# Patient Record
Sex: FEMALE | Race: WHITE | NOT HISPANIC OR LATINO | Employment: FULL TIME | URBAN - NONMETROPOLITAN AREA
[De-identification: names, ages, dates, MRNs, and addresses within clinical notes are randomized per-mention and may not be internally consistent; named-entity substitution may affect disease eponyms.]

---

## 2021-06-15 ENCOUNTER — CONSULT (OUTPATIENT)
Dept: ONCOLOGY | Facility: CLINIC | Age: 51
End: 2021-06-15

## 2021-06-15 ENCOUNTER — LAB (OUTPATIENT)
Dept: LAB | Facility: HOSPITAL | Age: 51
End: 2021-06-15

## 2021-06-15 VITALS
HEART RATE: 85 BPM | HEIGHT: 68 IN | TEMPERATURE: 97.3 F | DIASTOLIC BLOOD PRESSURE: 78 MMHG | SYSTOLIC BLOOD PRESSURE: 132 MMHG | OXYGEN SATURATION: 99 % | WEIGHT: 207.4 LBS | BODY MASS INDEX: 31.43 KG/M2

## 2021-06-15 DIAGNOSIS — C43.59 MALIGNANT MELANOMA OF TORSO EXCLUDING BREAST (HCC): ICD-10-CM

## 2021-06-15 DIAGNOSIS — D64.9 ANEMIA, UNSPECIFIED TYPE: ICD-10-CM

## 2021-06-15 DIAGNOSIS — E66.9 OBESITY, UNSPECIFIED CLASSIFICATION, UNSPECIFIED OBESITY TYPE, UNSPECIFIED WHETHER SERIOUS COMORBIDITY PRESENT: ICD-10-CM

## 2021-06-15 DIAGNOSIS — D75.89 BONE MARROW DEPRESSION: ICD-10-CM

## 2021-06-15 DIAGNOSIS — E61.1 IRON DEFICIENCY: ICD-10-CM

## 2021-06-15 DIAGNOSIS — C43.59 MALIGNANT MELANOMA OF TORSO EXCLUDING BREAST (HCC): Primary | ICD-10-CM

## 2021-06-15 LAB
ALBUMIN SERPL-MCNC: 4.1 G/DL (ref 3.5–5.2)
ALBUMIN/GLOB SERPL: 1.2 G/DL
ALP SERPL-CCNC: 103 U/L (ref 39–117)
ALT SERPL W P-5'-P-CCNC: 17 U/L (ref 1–33)
ANION GAP SERPL CALCULATED.3IONS-SCNC: 9 MMOL/L (ref 5–15)
AST SERPL-CCNC: 17 U/L (ref 1–32)
BASOPHILS # BLD AUTO: 0.05 10*3/MM3 (ref 0–0.2)
BASOPHILS NFR BLD AUTO: 0.8 % (ref 0–1.5)
BILIRUB SERPL-MCNC: 0.3 MG/DL (ref 0–1.2)
BUN SERPL-MCNC: 13 MG/DL (ref 6–20)
BUN/CREAT SERPL: 21.7 (ref 7–25)
CALCIUM SPEC-SCNC: 10 MG/DL (ref 8.6–10.5)
CHLORIDE SERPL-SCNC: 101 MMOL/L (ref 98–107)
CO2 SERPL-SCNC: 26 MMOL/L (ref 22–29)
CREAT SERPL-MCNC: 0.6 MG/DL (ref 0.57–1)
DEPRECATED RDW RBC AUTO: 59.8 FL (ref 37–54)
EOSINOPHIL # BLD AUTO: 0.26 10*3/MM3 (ref 0–0.4)
EOSINOPHIL NFR BLD AUTO: 4.3 % (ref 0.3–6.2)
ERYTHROCYTE [DISTWIDTH] IN BLOOD BY AUTOMATED COUNT: 22.2 % (ref 12.3–15.4)
ERYTHROCYTE [SEDIMENTATION RATE] IN BLOOD: 19 MM/HR (ref 0–20)
FERRITIN SERPL-MCNC: 13.07 NG/ML (ref 13–150)
FOLATE SERPL-MCNC: 16.7 NG/ML (ref 4.78–24.2)
GFR SERPL CREATININE-BSD FRML MDRD: 105 ML/MIN/1.73
GLOBULIN UR ELPH-MCNC: 3.4 GM/DL
GLUCOSE SERPL-MCNC: 117 MG/DL (ref 65–99)
HCT VFR BLD AUTO: 36.7 % (ref 34–46.6)
HGB BLD-MCNC: 10.5 G/DL (ref 12–15.9)
IMM GRANULOCYTES # BLD AUTO: 0.01 10*3/MM3 (ref 0–0.05)
IMM GRANULOCYTES NFR BLD AUTO: 0.2 % (ref 0–0.5)
IRON 24H UR-MRATE: 29 MCG/DL (ref 37–145)
IRON SATN MFR SERPL: 5 % (ref 20–50)
LYMPHOCYTES # BLD AUTO: 1.32 10*3/MM3 (ref 0.7–3.1)
LYMPHOCYTES NFR BLD AUTO: 22.1 % (ref 19.6–45.3)
MCH RBC QN AUTO: 22.1 PG (ref 26.6–33)
MCHC RBC AUTO-ENTMCNC: 28.6 G/DL (ref 31.5–35.7)
MCV RBC AUTO: 77.3 FL (ref 79–97)
MONOCYTES # BLD AUTO: 0.42 10*3/MM3 (ref 0.1–0.9)
MONOCYTES NFR BLD AUTO: 7 % (ref 5–12)
NEUTROPHILS NFR BLD AUTO: 3.92 10*3/MM3 (ref 1.7–7)
NEUTROPHILS NFR BLD AUTO: 65.6 % (ref 42.7–76)
NRBC BLD AUTO-RTO: 0 /100 WBC (ref 0–0.2)
PLATELET # BLD AUTO: 332 10*3/MM3 (ref 140–450)
PMV BLD AUTO: 10.5 FL (ref 6–12)
POTASSIUM SERPL-SCNC: 4 MMOL/L (ref 3.5–5.2)
PROT SERPL-MCNC: 7.5 G/DL (ref 6–8.5)
RBC # BLD AUTO: 4.75 10*6/MM3 (ref 3.77–5.28)
RETICS # AUTO: 0.1 10*6/MM3 (ref 0.02–0.13)
RETICS/RBC NFR AUTO: 2.14 % (ref 0.7–1.9)
SODIUM SERPL-SCNC: 136 MMOL/L (ref 136–145)
TIBC SERPL-MCNC: 539 MCG/DL (ref 298–536)
TRANSFERRIN SERPL-MCNC: 362 MG/DL (ref 200–360)
VIT B12 BLD-MCNC: 979 PG/ML (ref 211–946)
WBC # BLD AUTO: 5.98 10*3/MM3 (ref 3.4–10.8)

## 2021-06-15 PROCEDURE — 80053 COMPREHEN METABOLIC PANEL: CPT

## 2021-06-15 PROCEDURE — 82746 ASSAY OF FOLIC ACID SERUM: CPT

## 2021-06-15 PROCEDURE — 82728 ASSAY OF FERRITIN: CPT

## 2021-06-15 PROCEDURE — 82607 VITAMIN B-12: CPT

## 2021-06-15 PROCEDURE — 36415 COLL VENOUS BLD VENIPUNCTURE: CPT

## 2021-06-15 PROCEDURE — 85651 RBC SED RATE NONAUTOMATED: CPT

## 2021-06-15 PROCEDURE — 85045 AUTOMATED RETICULOCYTE COUNT: CPT

## 2021-06-15 PROCEDURE — 83540 ASSAY OF IRON: CPT

## 2021-06-15 PROCEDURE — 85025 COMPLETE CBC W/AUTO DIFF WBC: CPT

## 2021-06-15 PROCEDURE — 99204 OFFICE O/P NEW MOD 45 MIN: CPT | Performed by: INTERNAL MEDICINE

## 2021-06-15 PROCEDURE — 84466 ASSAY OF TRANSFERRIN: CPT

## 2021-06-15 RX ORDER — LISINOPRIL 40 MG/1
40 TABLET ORAL DAILY
COMMUNITY

## 2021-06-15 RX ORDER — FERROUS SULFATE 325(65) MG
325 TABLET ORAL
COMMUNITY
End: 2021-08-05

## 2021-06-15 RX ORDER — HYDROCHLOROTHIAZIDE 25 MG/1
25 TABLET ORAL DAILY
COMMUNITY

## 2021-06-15 RX ORDER — PANTOPRAZOLE SODIUM 20 MG/1
20 TABLET, DELAYED RELEASE ORAL DAILY
COMMUNITY

## 2021-06-15 RX ORDER — ATORVASTATIN CALCIUM 20 MG/1
20 TABLET, FILM COATED ORAL DAILY
COMMUNITY
End: 2021-07-19

## 2021-06-15 NOTE — ASSESSMENT & PLAN NOTE
1. 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 She believes taht over the last 27 years she could have been exposed organic chemicals  3. We will run initial FLOW study to R/O signs of BM problems

## 2021-06-15 NOTE — PROGRESS NOTES
[facility]  [address]  [place] [address] [phone]    Chief Complaint  iron deficiency    Subjective            REASON FOR CONSULTATION: Pilar Mesa is a 51 y.o. female referred by Rahul Griggs DO for diagnostic and management recommendations for Fe def anemia .    She is here today mainly for anemia. She had a long HX of Fe deficiency and could not tolerate oral Fe tablets. She had a recent fast weight gain and has been very much stressed out. Her recent HGB was only 8 ranges and other related tests did show evidence of Fe deficiency. 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 Over the last 6 month she has been feeling very tired    Of note she had the DX of abd wall melanoma in 2010 and she has had wide local resection of mid abd skin/soft tissue in addition to deep resection close to abd muscle wall. This is based on the fact that the path report did show potential muscle invasion.    She is now concerned that she did not have surveillance study and also never had discussion for ADJ TX while she does understand that the chance for recurrent melanoma is sigfnicant    Oncology/Hematology History    No history exists.         PAST MEDICAL HISTORY:  ALLERGIES:  Allergies   Allergen Reactions   • Neosporin Plus Max St Rash     CURRENT MEDICATIONS:  Outpatient Encounter Medications as of 6/15/2021   Medication Sig Dispense Refill   • atorvastatin (LIPITOR) 20 MG tablet Take 20 mg by mouth Daily.     • ferrous sulfate 325 (65 FE) MG tablet Take 325 mg by mouth Daily With Breakfast.     • hydroCHLOROthiazide (HYDRODIURIL) 25 MG tablet Take 25 mg by mouth Daily.     • lisinopril (PRINIVIL,ZESTRIL) 40 MG tablet Take 40 mg by mouth Daily.     • pantoprazole (PROTONIX) 20 MG EC tablet Take 20 mg by mouth Daily.        No facility-administered encounter medications on file as of 6/15/2021.     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   • Obesity E66.9     SURGERIES:  Past Surgical History:   Procedure Laterality Date   • APPENDECTOMY     • CHOLECYSTECTOMY  2017   • LYMPH NODE BIOPSY     • POSTPARTUM TUBAL LIGATION     • SINUS SURGERY     • SKIN CANCER EXCISION       HEALTH MAINTENANCE ITEMS:  Health Maintenance Due   Topic Date Due   • MAMMOGRAM  Never done   • COLORECTAL CANCER SCREENING  Never done   • ANNUAL PHYSICAL  Never done   • COVID-19 Vaccine (1) Never done   • TDAP/TD VACCINES (1 - Tdap) Never done   • ZOSTER VACCINE (1 of 2) Never done   • HEPATITIS C SCREENING  Never done   • PAP SMEAR  Never done       <no information>  Last Completed Colonoscopy     This patient has no relevant Health Maintenance data.          There is no immunization history on file for this patient.  Last Completed Mammogram     This patient has no relevant Health Maintenance data.            FAMILY HISTORY:  Family History   Problem Relation Age of Onset   • Heart disease Father    • Lung cancer Father      SOCIAL HISTORY:  Social History     Socioeconomic History   • Marital status:      Spouse name: Not on file   • Number of children: Not on file   • Years of education: Not on file   • Highest education level: Not on file   Tobacco Use   • Smoking status: Never Smoker   • Smokeless tobacco: Never Used       REVIEW OF SYSTEMS:  Review of Systems   Constitutional: Positive for fatigue and unexpected weight gain.   HENT: Negative.    Eyes: Negative.    Respiratory: Positive for shortness of breath.    Cardiovascular: Negative.    Gastrointestinal: Negative.    Endocrine: Negative.    Genitourinary: Negative.    Musculoskeletal: Positive for back pain.  "  Allergic/Immunologic: Negative.    Neurological: Positive for weakness.   Psychiatric/Behavioral: Negative.        /78   Pulse 85   Temp 97.3 °F (36.3 °C)   Ht 172.7 cm (68\")   Wt 94.1 kg (207 lb 6.4 oz)   SpO2 99%   Breastfeeding No   BMI 31.54 kg/m²  Body surface area is 2.08 meters squared.  There were no vitals filed for this visit.    Objective   Vital Signs:   /78   Pulse 85   Temp 97.3 °F (36.3 °C)   Ht 172.7 cm (68\")   Wt 94.1 kg (207 lb 6.4 oz)   SpO2 99%   Breastfeeding No   BMI 31.54 kg/m²  Body surface area is 2.08 meters squared.  There were no vitals filed for this visit.  Pilar Mesa reports a pain score of .  Given her pain assessment as noted, treatment options were discussed and the following options were decided upon as a follow-up plan to address the patient's pain: .      Patient's Body mass index is 31.54 kg/m². indicating that she is .      Physical Exam  Constitutional:       Appearance: Normal appearance. She is obese.   HENT:      Head: Atraumatic.      Mouth/Throat:      Mouth: Mucous membranes are moist.   Pulmonary:      Effort: Pulmonary effort is normal.   Abdominal:      Palpations: Abdomen is soft.      Comments: She has mid to upper abd  Close to 20 cm scar from prior melanoma surgery   Musculoskeletal:         General: Normal range of motion.      Cervical back: Normal range of motion.   Neurological:      General: No focal deficit present.      Mental Status: She is oriented to person, place, and time.   Psychiatric:         Mood and Affect: Mood normal.         Behavior: Behavior normal.            Result Review :     LABS    Lab Results - Last 18 Months   Lab Units 06/15/21  0910   HEMOGLOBIN g/dL 10.5*   HEMATOCRIT % 36.7   MCV fL 77.3*   WBC 10*3/mm3 5.98   RDW % 22.2*   MPV fL 10.5   PLATELETS 10*3/mm3 332   IMM GRAN % % 0.2   NEUTROS ABS 10*3/mm3 3.92   LYMPHS ABS 10*3/mm3 1.32   MONOS ABS 10*3/mm3 0.42   EOS ABS 10*3/mm3 0.26   BASOS ABS " 10*3/mm3 0.05   IMMATURE GRANS (ABS) 10*3/mm3 0.01   NRBC /100 WBC 0.0       Lab Results - Last 18 Months   Lab Units 06/15/21  0910   GLUCOSE mg/dL 117*   SODIUM mmol/L 136   POTASSIUM mmol/L 4.0   CO2 mmol/L 26.0   CHLORIDE mmol/L 101   ANION GAP mmol/L 9.0   CREATININE mg/dL 0.60   BUN mg/dL 13   BUN / CREAT RATIO  21.7   CALCIUM mg/dL 10.0   EGFR IF NONAFRICN AM mL/min/1.73 105   ALK PHOS U/L 103   TOTAL PROTEIN g/dL 7.5   ALT (SGPT) U/L 17   AST (SGOT) U/L 17   BILIRUBIN mg/dL 0.3   ALBUMIN g/dL 4.10   GLOBULIN gm/dL 3.4       No results for input(s): MSPIKE, KAPPALAMB, IGLFLC, URICACID, FREEKAPPAL, CEA, LDH, REFLABREPO in the last 93269 hours.    Lab Results - Last 18 Months   Lab Units 06/15/21  0910   IRON mcg/dL 29*   TIBC mcg/dL 539*   IRON SATURATION % 5*   FERRITIN ng/mL 13.07              Assessment and Plan    Problem List Items Addressed This Visit        Other    Iron deficiency    Current Assessment & Plan     1. She has a long HX of Fe deficiency  2. We have discussed about a complete initial SAM  3. She will be back in 2 weeks  4. She has tried oral Fe table ts before and could not tolerate it due to abd pain constipation and diarrhea         Relevant Orders    CBC & Differential (Completed)    Ferritin (Completed)    Reticulocytes (Completed)    Vitamin B12    Sedimentation Rate (Completed)    Malignant melanoma of torso excluding breast (CMS/HCC) - Primary    Current Assessment & Plan     1. She had her melanoma removed with LN dissection, due to some of the high risk profiles  2. She did not have ADJ TX  3. She will need PET CT restaging  4. We will first do the Ct scan of chest/abd /pelvis  5. On exam there was large abd scan on her upper abd areas         Relevant Orders    NM Pet Skull Base To Mid Thigh    Sedimentation Rate (Completed)    Flow Cytometry, Blood    CT chest w contrast    CT abdomen pelvis w contrast    Anemia    Current Assessment & Plan     1. 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  She believes taht over the last 27 years she could have been exposed organic chemicals  3. We will run initial FLOW study to R/O signs of BM problems         Relevant Medications    ferrous sulfate 325 (65 FE) MG tablet    Other Relevant Orders    CBC & Differential (Completed)    Comprehensive Metabolic Panel (Completed)    Iron Profile (Completed)    Ferritin (Completed)    Reticulocytes (Completed)    Vitamin B12    Folate    Sedimentation Rate (Completed)    Flow Cytometry, Blood    Bone marrow depression    Current Assessment & Plan     1. Her Hgb is too low even considering low Fe profiles  2. Based on overall prior work HX and CBC pattern in addition to work HX, we have decided to run BM BX         Relevant Orders    Flow Cytometry, Blood    Obesity    Current Assessment & Plan     1. We have discussed about the benefit of structured weight loss program on both loss and production of RBC         Relevant Orders    Ambulatory Referral to Bariatric Surgery        In summary  1. She has HGB of 8 range and HX of aggressive melanoma  2. We have discussed about various necessary steps as above  3. She will be back in 2 weeks  4. She will likely need IV FE in return visit    Follow Up     Patient was given instructions and counseling regarding her condition or for health maintenance advice. Please see specific information pulled into the AVS if appropriate.

## 2021-06-15 NOTE — ASSESSMENT & PLAN NOTE
1. We have discussed about the benefit of structured weight loss program on both loss and production of RBC

## 2021-06-15 NOTE — ASSESSMENT & PLAN NOTE
1. She has a long HX of Fe deficiency  2. We have discussed about a complete initial SAM  3. She will be back in 2 weeks  4. She has tried oral Fe table ts before and could not tolerate it due to abd pain constipation and diarrhea

## 2021-06-15 NOTE — ASSESSMENT & PLAN NOTE
1. Her Hgb is too low even considering low Fe profiles  2. Based on overall prior work HX and CBC pattern in addition to work HX, we have decided to run BM BX

## 2021-06-15 NOTE — ASSESSMENT & PLAN NOTE
1. She had her melanoma removed with LN dissection, due to some of the high risk profiles  2. She did not have ADJ TX  3. She will need PET CT restaging  4. We will first do the Ct scan of chest/abd /pelvis  5. On exam there was large abd scan on her upper abd areas

## 2021-06-16 ENCOUNTER — LAB (OUTPATIENT)
Dept: LAB | Facility: HOSPITAL | Age: 51
End: 2021-06-16

## 2021-06-16 DIAGNOSIS — C43.59 MALIGNANT MELANOMA OF TORSO EXCLUDING BREAST (HCC): ICD-10-CM

## 2021-06-16 DIAGNOSIS — D64.9 ANEMIA, UNSPECIFIED TYPE: ICD-10-CM

## 2021-06-16 DIAGNOSIS — D75.89 BONE MARROW DEPRESSION: ICD-10-CM

## 2021-06-18 ENCOUNTER — TELEPHONE (OUTPATIENT)
Dept: ONCOLOGY | Facility: CLINIC | Age: 51
End: 2021-06-18

## 2021-06-18 LAB — REF LAB TEST METHOD: NORMAL

## 2021-06-18 NOTE — TELEPHONE ENCOUNTER
Caller: LYDIA    Relationship: [facility]    Best call back number: [phone]     What test/procedure requested: CT CHEST/ ABDOMEN/PELVIS    When is it needed: NA    Where is the test/procedure going to be performed: [facility]    Additional information or concerns: INS DENIED PROCEDURE BUT ARE OFFERING A PEER TO PEER.   CASE # [id number]  PEER TO PEER # [phone]   PLEASE COMPLETE WITHIN 10 DAYS

## 2021-06-23 ENCOUNTER — OFFICE VISIT (OUTPATIENT)
Dept: BARIATRICS/WEIGHT MGMT | Facility: HOSPITAL | Age: 51
End: 2021-06-23

## 2021-06-23 ENCOUNTER — OFFICE VISIT (OUTPATIENT)
Dept: BARIATRICS/WEIGHT MGMT | Facility: CLINIC | Age: 51
End: 2021-06-23

## 2021-06-23 VITALS
SYSTOLIC BLOOD PRESSURE: 131 MMHG | HEART RATE: 94 BPM | HEIGHT: 66 IN | OXYGEN SATURATION: 99 % | WEIGHT: 208.6 LBS | DIASTOLIC BLOOD PRESSURE: 87 MMHG | TEMPERATURE: 98.6 F | BODY MASS INDEX: 33.52 KG/M2

## 2021-06-23 DIAGNOSIS — E66.09 CLASS 1 OBESITY DUE TO EXCESS CALORIES WITH SERIOUS COMORBIDITY AND BODY MASS INDEX (BMI) OF 33.0 TO 33.9 IN ADULT: Primary | ICD-10-CM

## 2021-06-23 DIAGNOSIS — K21.9 GASTROESOPHAGEAL REFLUX DISEASE, UNSPECIFIED WHETHER ESOPHAGITIS PRESENT: ICD-10-CM

## 2021-06-23 PROBLEM — E66.811 CLASS 1 OBESITY DUE TO EXCESS CALORIES WITH SERIOUS COMORBIDITY AND BODY MASS INDEX (BMI) OF 33.0 TO 33.9 IN ADULT: Status: ACTIVE | Noted: 2021-06-15

## 2021-06-23 PROCEDURE — 99203 OFFICE O/P NEW LOW 30 MIN: CPT | Performed by: SURGERY

## 2021-06-23 NOTE — PROGRESS NOTES
Patient Care Team:  Rahul Griggs DO as PCP - General (Family Medicine)    Reason for Visit:  Weight loss    Subjective      Pilar Mesa is a pleasant 51 y.o. female and presents with morbid obesity with her Body mass index is 33.42 kg/m²..    She is here for discussion of weight loss options.  She stated she has been with the disease of obesity for year(s).  She stated she suffers from hypertension, GERD and morbid obesity due to her weight gain.  She stated that weight loss helps alleviate these symptoms.   She stated that she has tried no structured dietary regimen to help with weight loss.  She stated that she has attempted these conservative methods for weight loss without maintaining long term success.  Today she would like to discuss nonsurgical options for the treatment of her morbid obesity.      Review of Systems  General ROS: positive for  - fatigue and weight gain  Psychological ROS: positive for - anxiety and depression  Endocrine ROS: negative  Respiratory ROS: no cough, shortness of breath, or wheezing  Cardiovascular ROS: no chest pain or dyspnea on exertion  Gastrointestinal ROS: no abdominal pain, change in bowel habits, or black or bloody stools  Musculoskeletal ROS: positive for - joint pain    History  Past Medical History:   Diagnosis Date   • GERD (gastroesophageal reflux disease)    • High cholesterol    • Skin cancer 2010     Past Surgical History:   Procedure Laterality Date   • APPENDECTOMY     • CHOLECYSTECTOMY  2017   • LYMPH NODE BIOPSY     • POSTPARTUM TUBAL LIGATION     • SINUS SURGERY     • SKIN CANCER EXCISION       Family History   Problem Relation Age of Onset   • Heart disease Father    • Lung cancer Father      Social History     Tobacco Use   • Smoking status: Never Smoker   • Smokeless tobacco: Never Used   Substance Use Topics   • Alcohol use: Not on file   • Drug use: Not on file     E-cigarette/Vaping     E-cigarette/Vaping Substances     (Not in a hospital  admission)    Allergies:  Neosporin plus max st      Current Outpatient Medications:   •  atorvastatin (LIPITOR) 20 MG tablet, Take 20 mg by mouth Daily., Disp: , Rfl:   •  ferrous sulfate 325 (65 FE) MG tablet, Take 325 mg by mouth Daily With Breakfast. Every other day, Disp: , Rfl:   •  hydroCHLOROthiazide (HYDRODIURIL) 25 MG tablet, Take 25 mg by mouth Daily., Disp: , Rfl:   •  lisinopril (PRINIVIL,ZESTRIL) 40 MG tablet, Take 40 mg by mouth Daily., Disp: , Rfl:   •  pantoprazole (PROTONIX) 20 MG EC tablet, Take 20 mg by mouth Daily., Disp: , Rfl:     Objective     Vital Signs  Temp:  [98.6 °F (37 °C)] 98.6 °F (37 °C)  Heart Rate:  [94] 94  BP: (131)/(87) 131/87  Body mass index is 33.42 kg/m².      06/23/21  0923   Weight: 94.6 kg (208 lb 9.6 oz)       General Appearance: Alert and oriented in no acute distress  Lungs:  Normal expansion.  Clear to auscultation.  No rales, rhonchi, or wheezing.  Heart:  Heart regular rate and rhythm  Abdomen:  Soft, non-tender, normal bowel sounds; no bruits, organomegaly or masses.  Abnormal shape: obese      Results Review:   None        Assessment/Plan   Encounter Diagnoses   Name Primary?   • Class 1 obesity due to excess calories with serious comorbidity and body mass index (BMI) of 33.0 to 33.9 in adult Yes   • Gastroesophageal reflux disease, unspecified whether esophagitis present        She has been provided a structured dietary regimen based off of her behavior.  I discussed with the patient the etiology of the disease of obesity and the potential comorbid conditions associated with this disease.  She was instructed to follow the dietary regimen and follow-up with our program in 1 month's time with any additional questions as they may arise during this time.  We emphasized on focusing on proteins and meals high in fiber as well as adequate hydration that exceed 64 ounces of water daily.  I recommended the patient record daily a food journal that would incorporate what  "she is eating and how many times she is eating during the day.  My recommendation included at least 21 consecutive days of recording of these meals.  I explained that I anticipate the patient to lose weight prior to her next monthly visit.  I have also explained that they need to record or document when they are going to have the \"cheat day\" as well as create a food journal to help monitor their behavior and food choices.    I discussed the patient's findings and my recommendations with patient. The patient was made aware that we are primarily a surgical program.  We reviewed different weight loss surgical procedures including the laparoscopic sleeve gastrectomy, gastric band and Miquel-en-Y gastric bypass.  I explained to the patient that medical treatment alone is ineffective for long-term results and for the reversal of morbid obesity. She and I discussed the etiology of the disease of morbid obesity.  I emphasized that weight loss alone statistically will not reverse this disease of obesity.  Our program is not a \"weight loss program\" but focuses on the overall issue of morbid obesity and the patient has been educated on what steps will be necessary to be successful in reversing this disease of morbid obesity at our facility.    I have explained the 3 pearls of our program for the patient to follow to optimize success:1.  Putting their health first, 2.  Not trying to treat the disease on their own, 3.  Attempting to make their scheduled appointments.  The patient was in agreement to following these recommendations.  The patient was also notified that our dietitian will be contacting them soon for follow-up on how they are doing with the new prescription.    Patient is to follow-up with us in 1 month's time.    Dr. Jd Zapata MD FACS    06/23/21  12:45 CDT  Patient Care Team:  Rahul Griggs, DO as PCP - General (Family Medicine)    "

## 2021-06-23 NOTE — PROGRESS NOTES
"Nutrition Services    Patient Name:  Pilar Mesa  YOB: 1970  MRN: [id number]  Admit Date:  (Not on file)    NUTRITION BARIATRIC/[facility] NOTE     Visit 1  Initial Assessment   BA#   [facility]    Anthropometrics   Height: 66.25 in  Weight: 208 lbs 9.6 oz  BMI: 33.42    Nutrition Recall  Eating __3____ meals daily   Snacking  Limited sweet intake  Drinking carbonated beverages  Drinking less than 64 fluid ounces    Education    Goal Setting and Information Packet  4 meal per day diet plan  4 meal per day sample menu  \"Perfect Protein, Fiber in Foods, and Reducing Fat\"  Reinforce Nutritional Needs for Surgery  Reinforce Nutritional Needs for [facility]    Nutrition Goals   Eat __4___ meals per day with protein  Eat protein first at meals  Protein goal: 65gms   discussed protein guidelines for shakes and bar  Eliminate snacks  Healthier food choices  Portion control / Use smaller plate or measuring cup   Eliminate soda  Replace sugar beverages with artifical sweetened   Increase fluid intake to 64 ounces per day        Electronically signed by:  Elsi Rosario  06/23/21 11:48 CDT   "

## 2021-06-25 ENCOUNTER — APPOINTMENT (OUTPATIENT)
Dept: CT IMAGING | Facility: HOSPITAL | Age: 51
End: 2021-06-25

## 2021-06-29 ENCOUNTER — OFFICE VISIT (OUTPATIENT)
Dept: ONCOLOGY | Facility: CLINIC | Age: 51
End: 2021-06-29

## 2021-06-29 ENCOUNTER — TRANSCRIBE ORDERS (OUTPATIENT)
Dept: LAB | Facility: HOSPITAL | Age: 51
End: 2021-06-29

## 2021-06-29 VITALS
BODY MASS INDEX: 33.04 KG/M2 | HEART RATE: 102 BPM | SYSTOLIC BLOOD PRESSURE: 124 MMHG | RESPIRATION RATE: 18 BRPM | DIASTOLIC BLOOD PRESSURE: 72 MMHG | WEIGHT: 205.6 LBS | TEMPERATURE: 97.7 F | OXYGEN SATURATION: 95 % | HEIGHT: 66 IN

## 2021-06-29 DIAGNOSIS — Z01.818 PREOPERATIVE TESTING: Primary | ICD-10-CM

## 2021-06-29 DIAGNOSIS — E61.1 IRON DEFICIENCY: ICD-10-CM

## 2021-06-29 DIAGNOSIS — C43.8 MALIGNANT MELANOMA OF OVERLAPPING SITES (HCC): ICD-10-CM

## 2021-06-29 DIAGNOSIS — D75.89 BONE MARROW DEPRESSION: ICD-10-CM

## 2021-06-29 DIAGNOSIS — D64.9 ANEMIA, UNSPECIFIED TYPE: Primary | ICD-10-CM

## 2021-06-29 PROCEDURE — 99214 OFFICE O/P EST MOD 30 MIN: CPT | Performed by: INTERNAL MEDICINE

## 2021-06-29 RX ORDER — DIPHENHYDRAMINE HYDROCHLORIDE 50 MG/ML
50 INJECTION INTRAMUSCULAR; INTRAVENOUS AS NEEDED
Status: CANCELLED | OUTPATIENT
Start: 2021-07-13

## 2021-06-29 RX ORDER — TEMAZEPAM 15 MG/1
15 CAPSULE ORAL NIGHTLY PRN
COMMUNITY
End: 2021-11-04

## 2021-06-29 RX ORDER — SIMVASTATIN 20 MG
20 TABLET ORAL NIGHTLY
COMMUNITY
End: 2021-11-04

## 2021-06-29 RX ORDER — SERTRALINE HYDROCHLORIDE 25 MG/1
25 TABLET, FILM COATED ORAL DAILY
COMMUNITY

## 2021-06-29 RX ORDER — FAMOTIDINE 10 MG/ML
20 INJECTION, SOLUTION INTRAVENOUS AS NEEDED
Status: CANCELLED | OUTPATIENT
Start: 2021-07-13

## 2021-06-29 RX ORDER — SODIUM CHLORIDE 9 MG/ML
250 INJECTION, SOLUTION INTRAVENOUS ONCE
Status: CANCELLED | OUTPATIENT
Start: 2021-07-13

## 2021-06-29 RX ORDER — BUSPIRONE HYDROCHLORIDE 10 MG/1
10 TABLET ORAL 3 TIMES DAILY
COMMUNITY

## 2021-06-29 RX ORDER — ACETAMINOPHEN 325 MG/1
650 TABLET ORAL ONCE
Status: CANCELLED | OUTPATIENT
Start: 2021-07-13

## 2021-06-29 NOTE — PROGRESS NOTES
[facility] HEMATOLOGY AND ONCOLOGY  [address]  [facility] [address] [phone]    Chief Complaint  Follow-up    Subjective    1. fatigue       Oncology/Hematology History    No history exists.     She is here today mainly for anemia. She had a long HX of Fe deficiency and could not tolerate oral Fe tablets. She had a recent fast weight gain and has been very much stressed out. Her recent HGB was only 8 ranges and other related tests did show evidence of Fe deficiency. 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 Over the last 6 month she has been feeling very tired     Of note she had the DX of abd wall melanoma in 2010 and she has had wide local resection of mid abd skin/soft tissue in addition to deep resection close to abd muscle wall. This is based on the fact that the path report did show potential muscle invasion.     She is now concerned that she did not have surveillance study and also never had discussion for ADJ TX while she does understand that the chance for recurrent melanoma is sigfnicant    PAST MEDICAL HISTORY:  ALLERGIES:  Allergies   Allergen Reactions   • Neosporin Plus Max St Rash     CURRENT MEDICATIONS:  Outpatient Encounter Medications as of 6/29/2021   Medication Sig Dispense Refill   • busPIRone (BUSPAR) 10 MG tablet Take 10 mg by mouth 3 (Three) Times a Day.     • ferrous sulfate 325 (65 FE) MG tablet Take 325 mg by mouth Daily With Breakfast. Every other day     • hydroCHLOROthiazide (HYDRODIURIL) 25 MG tablet Take 25 mg by mouth Daily.     • lisinopril (PRINIVIL,ZESTRIL) 40 MG tablet Take 40 mg by mouth Daily.     • pantoprazole (PROTONIX) 20 MG EC tablet Take 20 mg by mouth Daily.     • sertraline (ZOLOFT) 25 MG tablet Take 25 mg by mouth Daily.     • simvastatin (Zocor) 20 MG tablet Take 20 mg by mouth Every Night.      • temazepam (RESTORIL) 15 MG capsule Take 15 mg by mouth At Night As Needed for Sleep.     • atorvastatin (LIPITOR) 20 MG tablet Take 20 mg by mouth Daily.       No facility-administered encounter medications on file as of 6/29/2021.     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   • Class 1 obesity due to excess calories with serious comorbidity and body mass index (BMI) of 33.0 to 33.9 in adult E66.09, Z68.33   • GERD (gastroesophageal reflux disease) K21.9   • Malignant melanoma of overlapping sites (CMS/HCC) C43.8     SURGERIES:  Past Surgical History:   Procedure Laterality Date   • APPENDECTOMY     • CHOLECYSTECTOMY  2017   • LYMPH NODE BIOPSY     • POSTPARTUM TUBAL LIGATION     • SINUS SURGERY     • SKIN CANCER EXCISION       HEALTH MAINTENANCE ITEMS:  Health Maintenance Due   Topic Date Due   • MAMMOGRAM  Never done   • COLORECTAL CANCER SCREENING  Never done   • ANNUAL PHYSICAL  Never done   • COVID-19 Vaccine (1) Never done   • TDAP/TD VACCINES (1 - Tdap) Never done   • ZOSTER VACCINE (1 of 2) Never done   • HEPATITIS C SCREENING  Never done   • PAP SMEAR  Never done   • LIPID PANEL  Never done       <no information>  Last Completed Colonoscopy     This patient has no relevant Health Maintenance data.          There is no immunization history on file for this patient.  Last Completed Mammogram     This patient has no relevant Health Maintenance data.            FAMILY HISTORY:  Family History   Problem Relation Age of Onset   • Heart disease Father    • Lung cancer Father      SOCIAL HISTORY:  Social History     Socioeconomic History   • Marital status:      Spouse name: Not on file   • Number of children: Not on file   • Years of education: Not on file   • Highest education level: Not on file   Tobacco Use   • Smoking status: Never  "Smoker   • Smokeless tobacco: Never Used       REVIEW OF SYSTEMS:  Review of Systems   Constitutional: Positive for fatigue.   HENT: Negative.    Eyes: Negative.    Respiratory: Negative.    Cardiovascular: Negative.    Gastrointestinal: Negative.    Endocrine: Negative.    Genitourinary: Negative.    Musculoskeletal: Negative.    Skin: Positive for dry skin.   Allergic/Immunologic: Negative.    Neurological: Negative.    Psychiatric/Behavioral: Positive for stress.       /72   Pulse 102   Temp 97.7 °F (36.5 °C) (Temporal)   Resp 18   Ht 168.3 cm (66.25\")   Wt 93.3 kg (205 lb 9.6 oz)   SpO2 95%   BMI 32.93 kg/m²  Body surface area is 2.03 meters squared.  Pain Score    06/29/21 0803   PainSc: 0-No pain       Objective   Vital Signs:   /72   Pulse 102   Temp 97.7 °F (36.5 °C) (Temporal)   Resp 18   Ht 168.3 cm (66.25\")   Wt 93.3 kg (205 lb 9.6 oz)   SpO2 95%   BMI 32.93 kg/m²  Body surface area is 2.03 meters squared.  Pain Score    06/29/21 0803   PainSc: 0-No pain     Pilar Mesa reports a pain score of 0.  Given her pain assessment as noted, treatment options were discussed and the following options were decided upon as a follow-up plan to address the patient's pain: .      Patient's Body mass index is 32.93 kg/m². indicating that she is       Physical Exam  Constitutional:       Appearance: Normal appearance.   HENT:      Head: Atraumatic.      Mouth/Throat:      Mouth: Mucous membranes are dry.   Cardiovascular:      Rate and Rhythm: Normal rate.   Abdominal:      Palpations: Abdomen is soft.   Musculoskeletal:         General: Normal range of motion.      Cervical back: Normal range of motion.   Skin:     General: Skin is dry.   Neurological:      General: No focal deficit present.      Mental Status: She is oriented to person, place, and time.   Psychiatric:         Mood and Affect: Mood normal.         Behavior: Behavior normal.            Result Review :    LABS    Lab Results " - Last 18 Months   Lab Units 06/15/21  0910   HEMOGLOBIN g/dL 10.5*   HEMATOCRIT % 36.7   MCV fL 77.3*   WBC 10*3/mm3 5.98   RDW % 22.2*   MPV fL 10.5   PLATELETS 10*3/mm3 332   IMM GRAN % % 0.2   NEUTROS ABS 10*3/mm3 3.92   LYMPHS ABS 10*3/mm3 1.32   MONOS ABS 10*3/mm3 0.42   EOS ABS 10*3/mm3 0.26   BASOS ABS 10*3/mm3 0.05   IMMATURE GRANS (ABS) 10*3/mm3 0.01   NRBC /100 WBC 0.0       Lab Results - Last 18 Months   Lab Units 06/15/21  0910   GLUCOSE mg/dL 117*   SODIUM mmol/L 136   POTASSIUM mmol/L 4.0   CO2 mmol/L 26.0   CHLORIDE mmol/L 101   ANION GAP mmol/L 9.0   CREATININE mg/dL 0.60   BUN mg/dL 13   BUN / CREAT RATIO  21.7   CALCIUM mg/dL 10.0   EGFR IF NONAFRICN AM mL/min/1.73 105   ALK PHOS U/L 103   TOTAL PROTEIN g/dL 7.5   ALT (SGPT) U/L 17   AST (SGOT) U/L 17   BILIRUBIN mg/dL 0.3   ALBUMIN g/dL 4.10   GLOBULIN gm/dL 3.4       Lab Results - Last 18 Months   Lab Units 06/16/21  0701   REFERENCE LAB REPORT  See Attached Result        Lab Results - Last 18 Months   Lab Units 06/15/21  0910   IRON mcg/dL 29*   TIBC mcg/dL 539*   IRON SATURATION % 5*   FERRITIN ng/mL 13.07   FOLATE ng/mL 16.70              Assessment and Plan    Problem List Items Addressed This Visit        Other    Iron deficiency    Current Assessment & Plan     1. Due to her Fe profiles, she will receive IV FE         Anemia - Primary    Current Assessment & Plan     1. Her FLOW did not show abnormal findings         Relevant Orders    Sedimentation Rate    CBC & Differential    Comprehensive Metabolic Panel    CT Guided Bone Marrow Biopsy And Aspiration    Bone marrow depression    Current Assessment & Plan     1. Based on HX of melanoma and potential chemical exposure, a sigfnicant BM depression is suspected         Malignant melanoma of overlapping sites (CMS/HCC)    Current Assessment & Plan     1. She had HX of melanoma  2. She had rather extensive surgery  3. She did not have ADJ TX  4. We have ordered restaging CT scan              In summary  1. She has Fe def anemia  2. She will have IV FE  3. Underlying bone marrow depression is of concern  4. We will see her as a FU    Follow Up   Patient was given instructions and counseling regarding her condition or for health maintenance advice. Please see specific information pulled into the AVS if appropriate.

## 2021-06-30 NOTE — ASSESSMENT & PLAN NOTE
1. She had HX of melanoma  2. She had rather extensive surgery  3. She did not have ADJ TX  4. We have ordered restaging CT scan

## 2021-06-30 NOTE — ASSESSMENT & PLAN NOTE
1. Based on HX of melanoma and potential chemical exposure, a sigfnicant BM depression is suspected

## 2021-07-05 ENCOUNTER — LAB (OUTPATIENT)
Dept: LAB | Facility: HOSPITAL | Age: 51
End: 2021-07-05

## 2021-07-05 DIAGNOSIS — Z01.818 PREOPERATIVE TESTING: ICD-10-CM

## 2021-07-05 LAB — SARS-COV-2 ORF1AB RESP QL NAA+PROBE: NOT DETECTED

## 2021-07-05 PROCEDURE — U0004 COV-19 TEST NON-CDC HGH THRU: HCPCS

## 2021-07-05 PROCEDURE — C9803 HOPD COVID-19 SPEC COLLECT: HCPCS

## 2021-07-07 ENCOUNTER — HOSPITAL ENCOUNTER (OUTPATIENT)
Dept: CT IMAGING | Facility: HOSPITAL | Age: 51
Discharge: HOME | End: 2021-07-07
Admitting: RADIOLOGY

## 2021-07-07 VITALS
OXYGEN SATURATION: 99 % | SYSTOLIC BLOOD PRESSURE: 90 MMHG | HEART RATE: 80 BPM | HEIGHT: 68 IN | TEMPERATURE: 98.3 F | DIASTOLIC BLOOD PRESSURE: 65 MMHG | WEIGHT: 205.03 LBS | BODY MASS INDEX: 31.07 KG/M2 | RESPIRATION RATE: 18 BRPM

## 2021-07-07 DIAGNOSIS — D64.9 ANEMIA, UNSPECIFIED TYPE: ICD-10-CM

## 2021-07-07 LAB
APTT PPP: 23.6 SECONDS (ref 24.1–35)
BASOPHILS # BLD AUTO: 0.05 10*3/MM3 (ref 0–0.2)
BASOPHILS NFR BLD AUTO: 1 % (ref 0–1.5)
DEPRECATED RDW RBC AUTO: 54.7 FL (ref 37–54)
EOSINOPHIL # BLD AUTO: 0.24 10*3/MM3 (ref 0–0.4)
EOSINOPHIL NFR BLD AUTO: 4.9 % (ref 0.3–6.2)
ERYTHROCYTE [DISTWIDTH] IN BLOOD BY AUTOMATED COUNT: 20.4 % (ref 12.3–15.4)
HCT VFR BLD AUTO: 34.5 % (ref 34–46.6)
HGB BLD-MCNC: 10.6 G/DL (ref 12–15.9)
IMM GRANULOCYTES # BLD AUTO: 0 10*3/MM3 (ref 0–0.05)
IMM GRANULOCYTES NFR BLD AUTO: 0 % (ref 0–0.5)
INR PPP: 1 (ref 0.91–1.09)
LYMPHOCYTES # BLD AUTO: 1.25 10*3/MM3 (ref 0.7–3.1)
LYMPHOCYTES NFR BLD AUTO: 25.4 % (ref 19.6–45.3)
MCH RBC QN AUTO: 23.1 PG (ref 26.6–33)
MCHC RBC AUTO-ENTMCNC: 30.7 G/DL (ref 31.5–35.7)
MCV RBC AUTO: 75.3 FL (ref 79–97)
MONOCYTES # BLD AUTO: 0.39 10*3/MM3 (ref 0.1–0.9)
MONOCYTES NFR BLD AUTO: 7.9 % (ref 5–12)
NEUTROPHILS NFR BLD AUTO: 2.99 10*3/MM3 (ref 1.7–7)
NEUTROPHILS NFR BLD AUTO: 60.8 % (ref 42.7–76)
NRBC BLD AUTO-RTO: 0 /100 WBC (ref 0–0.2)
PLATELET # BLD AUTO: 252 10*3/MM3 (ref 140–450)
PMV BLD AUTO: 10.9 FL (ref 6–12)
PROTHROMBIN TIME: 12.4 SECONDS (ref 11.5–13.4)
RBC # BLD AUTO: 4.58 10*6/MM3 (ref 3.77–5.28)
WBC # BLD AUTO: 4.92 10*3/MM3 (ref 3.4–10.8)

## 2021-07-07 PROCEDURE — 25010000003 LIDOCAINE 1 % SOLUTION: Performed by: RADIOLOGY

## 2021-07-07 PROCEDURE — 88305 TISSUE EXAM BY PATHOLOGIST: CPT | Performed by: INTERNAL MEDICINE

## 2021-07-07 PROCEDURE — 85025 COMPLETE CBC W/AUTO DIFF WBC: CPT | Performed by: RADIOLOGY

## 2021-07-07 PROCEDURE — 77012 CT SCAN FOR NEEDLE BIOPSY: CPT

## 2021-07-07 PROCEDURE — 25010000002 MIDAZOLAM PER 1 MG: Performed by: RADIOLOGY

## 2021-07-07 PROCEDURE — 85730 THROMBOPLASTIN TIME PARTIAL: CPT | Performed by: RADIOLOGY

## 2021-07-07 PROCEDURE — 88313 SPECIAL STAINS GROUP 2: CPT | Performed by: INTERNAL MEDICINE

## 2021-07-07 PROCEDURE — 25010000002 FENTANYL CITRATE (PF) 50 MCG/ML SOLUTION: Performed by: RADIOLOGY

## 2021-07-07 PROCEDURE — 85610 PROTHROMBIN TIME: CPT | Performed by: RADIOLOGY

## 2021-07-07 PROCEDURE — 88311 DECALCIFY TISSUE: CPT | Performed by: INTERNAL MEDICINE

## 2021-07-07 RX ORDER — FENTANYL CITRATE 50 UG/ML
INJECTION, SOLUTION INTRAMUSCULAR; INTRAVENOUS
Status: COMPLETED | OUTPATIENT
Start: 2021-07-07 | End: 2021-07-07

## 2021-07-07 RX ORDER — LIDOCAINE HYDROCHLORIDE 10 MG/ML
INJECTION, SOLUTION INFILTRATION; PERINEURAL
Status: COMPLETED | OUTPATIENT
Start: 2021-07-07 | End: 2021-07-07

## 2021-07-07 RX ORDER — SODIUM CHLORIDE 0.9 % (FLUSH) 0.9 %
10 SYRINGE (ML) INJECTION AS NEEDED
Status: DISCONTINUED | OUTPATIENT
Start: 2021-07-07 | End: 2021-07-08 | Stop reason: HOSPADM

## 2021-07-07 RX ORDER — MIDAZOLAM HYDROCHLORIDE 1 MG/ML
INJECTION INTRAMUSCULAR; INTRAVENOUS
Status: COMPLETED | OUTPATIENT
Start: 2021-07-07 | End: 2021-07-07

## 2021-07-07 RX ORDER — SODIUM CHLORIDE 0.9 % (FLUSH) 0.9 %
3 SYRINGE (ML) INJECTION EVERY 12 HOURS SCHEDULED
Status: DISCONTINUED | OUTPATIENT
Start: 2021-07-07 | End: 2021-07-08 | Stop reason: HOSPADM

## 2021-07-07 RX ADMIN — MIDAZOLAM 1.5 MG: 1 INJECTION INTRAMUSCULAR; INTRAVENOUS at 10:39

## 2021-07-07 RX ADMIN — FENTANYL CITRATE 75 MCG: 50 INJECTION, SOLUTION INTRAMUSCULAR; INTRAVENOUS at 10:39

## 2021-07-07 RX ADMIN — LIDOCAINE HYDROCHLORIDE 10 ML: 10 INJECTION, SOLUTION INFILTRATION; PERINEURAL at 10:40

## 2021-07-07 NOTE — INTERVAL H&P NOTE
H&P reviewed. The patient was examined and there are no changes to the H&P.   The risks, benefits, and alternatives to the procedure were discussed in detail with the patient, including but not limited to, bleeding, infection, and damage to surrounding structures. Patient agrees to procedure. Moderate sedation is planned.    Tommie Peng MD  Radiology

## 2021-07-13 ENCOUNTER — INFUSION (OUTPATIENT)
Dept: ONCOLOGY | Facility: HOSPITAL | Age: 51
End: 2021-07-13

## 2021-07-13 VITALS
HEIGHT: 68 IN | WEIGHT: 204 LBS | DIASTOLIC BLOOD PRESSURE: 72 MMHG | BODY MASS INDEX: 30.92 KG/M2 | RESPIRATION RATE: 18 BRPM | HEART RATE: 72 BPM | TEMPERATURE: 97.4 F | SYSTOLIC BLOOD PRESSURE: 121 MMHG | OXYGEN SATURATION: 100 %

## 2021-07-13 DIAGNOSIS — E61.1 IRON DEFICIENCY: Primary | ICD-10-CM

## 2021-07-13 DIAGNOSIS — D50.9 IRON DEFICIENCY ANEMIA, UNSPECIFIED IRON DEFICIENCY ANEMIA TYPE: ICD-10-CM

## 2021-07-13 PROCEDURE — 96367 TX/PROPH/DG ADDL SEQ IV INF: CPT

## 2021-07-13 PROCEDURE — 25010000002 IRON SUCROSE PER 1 MG: Performed by: INTERNAL MEDICINE

## 2021-07-13 PROCEDURE — 96365 THER/PROPH/DIAG IV INF INIT: CPT

## 2021-07-13 PROCEDURE — 25010000002 DIPHENHYDRAMINE PER 50 MG: Performed by: INTERNAL MEDICINE

## 2021-07-13 RX ORDER — SODIUM CHLORIDE 9 MG/ML
250 INJECTION, SOLUTION INTRAVENOUS ONCE
Status: COMPLETED | OUTPATIENT
Start: 2021-07-13 | End: 2021-07-13

## 2021-07-13 RX ORDER — DIPHENHYDRAMINE HYDROCHLORIDE 50 MG/ML
50 INJECTION INTRAMUSCULAR; INTRAVENOUS AS NEEDED
Status: DISCONTINUED | OUTPATIENT
Start: 2021-07-13 | End: 2021-07-13 | Stop reason: HOSPADM

## 2021-07-13 RX ORDER — FAMOTIDINE 10 MG/ML
20 INJECTION, SOLUTION INTRAVENOUS AS NEEDED
Status: DISCONTINUED | OUTPATIENT
Start: 2021-07-13 | End: 2021-07-13 | Stop reason: HOSPADM

## 2021-07-13 RX ORDER — ACETAMINOPHEN 325 MG/1
650 TABLET ORAL ONCE
Status: COMPLETED | OUTPATIENT
Start: 2021-07-13 | End: 2021-07-13

## 2021-07-13 RX ADMIN — IRON SUCROSE 200 MG: 20 INJECTION, SOLUTION INTRAVENOUS at 11:39

## 2021-07-13 RX ADMIN — SODIUM CHLORIDE 250 ML: 9 INJECTION, SOLUTION INTRAVENOUS at 11:10

## 2021-07-13 RX ADMIN — ACETAMINOPHEN 650 MG: 325 TABLET, FILM COATED ORAL at 11:14

## 2021-07-13 RX ADMIN — DIPHENHYDRAMINE HYDROCHLORIDE 25 MG: 50 INJECTION, SOLUTION INTRAMUSCULAR; INTRAVENOUS at 11:14

## 2021-07-16 ENCOUNTER — TELEPHONE (OUTPATIENT)
Dept: BARIATRICS/WEIGHT MGMT | Facility: CLINIC | Age: 51
End: 2021-07-16

## 2021-07-16 LAB
CYTO UR: NORMAL
LAB AP CASE REPORT: NORMAL
LAB AP DIAGNOSIS COMMENT: NORMAL
LAB AP FLOW CYTOMETRY SUMMARY: NORMAL
PATH REPORT.FINAL DX SPEC: NORMAL
PATH REPORT.GROSS SPEC: NORMAL

## 2021-07-19 ENCOUNTER — OFFICE VISIT (OUTPATIENT)
Dept: BARIATRICS/WEIGHT MGMT | Facility: CLINIC | Age: 51
End: 2021-07-19

## 2021-07-19 ENCOUNTER — LAB (OUTPATIENT)
Dept: LAB | Facility: HOSPITAL | Age: 51
End: 2021-07-19

## 2021-07-19 ENCOUNTER — OFFICE VISIT (OUTPATIENT)
Dept: ONCOLOGY | Facility: CLINIC | Age: 51
End: 2021-07-19

## 2021-07-19 VITALS
DIASTOLIC BLOOD PRESSURE: 76 MMHG | HEART RATE: 71 BPM | OXYGEN SATURATION: 97 % | BODY MASS INDEX: 31.97 KG/M2 | WEIGHT: 198.9 LBS | HEIGHT: 66 IN | RESPIRATION RATE: 16 BRPM | SYSTOLIC BLOOD PRESSURE: 112 MMHG | TEMPERATURE: 97.5 F

## 2021-07-19 VITALS
SYSTOLIC BLOOD PRESSURE: 113 MMHG | WEIGHT: 199.6 LBS | TEMPERATURE: 98.4 F | HEIGHT: 66 IN | HEART RATE: 102 BPM | DIASTOLIC BLOOD PRESSURE: 76 MMHG | OXYGEN SATURATION: 97 % | BODY MASS INDEX: 32.08 KG/M2

## 2021-07-19 DIAGNOSIS — C43.8 MALIGNANT MELANOMA OF OVERLAPPING SITES (HCC): ICD-10-CM

## 2021-07-19 DIAGNOSIS — K21.9 GASTROESOPHAGEAL REFLUX DISEASE, UNSPECIFIED WHETHER ESOPHAGITIS PRESENT: ICD-10-CM

## 2021-07-19 DIAGNOSIS — E61.1 IRON DEFICIENCY: ICD-10-CM

## 2021-07-19 DIAGNOSIS — E66.09 CLASS 1 OBESITY DUE TO EXCESS CALORIES WITH SERIOUS COMORBIDITY AND BODY MASS INDEX (BMI) OF 33.0 TO 33.9 IN ADULT: Primary | ICD-10-CM

## 2021-07-19 DIAGNOSIS — D64.9 ANEMIA, UNSPECIFIED TYPE: Primary | ICD-10-CM

## 2021-07-19 DIAGNOSIS — D64.9 ANEMIA, UNSPECIFIED TYPE: ICD-10-CM

## 2021-07-19 DIAGNOSIS — E78.00 HIGH CHOLESTEROL: ICD-10-CM

## 2021-07-19 LAB
ALBUMIN SERPL-MCNC: 4.2 G/DL (ref 3.5–5.2)
ALBUMIN/GLOB SERPL: 1.5 G/DL
ALP SERPL-CCNC: 72 U/L (ref 39–117)
ALT SERPL W P-5'-P-CCNC: 20 U/L (ref 1–33)
ANION GAP SERPL CALCULATED.3IONS-SCNC: 12 MMOL/L (ref 5–15)
AST SERPL-CCNC: 20 U/L (ref 1–32)
BASOPHILS # BLD AUTO: 0.05 10*3/MM3 (ref 0–0.2)
BASOPHILS NFR BLD AUTO: 0.8 % (ref 0–1.5)
BILIRUB SERPL-MCNC: 0.2 MG/DL (ref 0–1.2)
BUN SERPL-MCNC: 14 MG/DL (ref 6–20)
BUN/CREAT SERPL: 21.9 (ref 7–25)
CALCIUM SPEC-SCNC: 9.7 MG/DL (ref 8.6–10.5)
CHLORIDE SERPL-SCNC: 101 MMOL/L (ref 98–107)
CO2 SERPL-SCNC: 23 MMOL/L (ref 22–29)
CREAT SERPL-MCNC: 0.64 MG/DL (ref 0.57–1)
DEPRECATED RDW RBC AUTO: 55.3 FL (ref 37–54)
EOSINOPHIL # BLD AUTO: 0.18 10*3/MM3 (ref 0–0.4)
EOSINOPHIL NFR BLD AUTO: 2.8 % (ref 0.3–6.2)
ERYTHROCYTE [DISTWIDTH] IN BLOOD BY AUTOMATED COUNT: 20.8 % (ref 12.3–15.4)
ERYTHROCYTE [SEDIMENTATION RATE] IN BLOOD: 8 MM/HR (ref 0–20)
GFR SERPL CREATININE-BSD FRML MDRD: 98 ML/MIN/1.73
GLOBULIN UR ELPH-MCNC: 2.8 GM/DL
GLUCOSE SERPL-MCNC: 102 MG/DL (ref 65–99)
HCT VFR BLD AUTO: 31.5 % (ref 34–46.6)
HGB BLD-MCNC: 9.5 G/DL (ref 12–15.9)
HOLD SPECIMEN: NORMAL
IMM GRANULOCYTES # BLD AUTO: 0.02 10*3/MM3 (ref 0–0.05)
IMM GRANULOCYTES NFR BLD AUTO: 0.3 % (ref 0–0.5)
LYMPHOCYTES # BLD AUTO: 1.22 10*3/MM3 (ref 0.7–3.1)
LYMPHOCYTES NFR BLD AUTO: 18.9 % (ref 19.6–45.3)
MCH RBC QN AUTO: 23.7 PG (ref 26.6–33)
MCHC RBC AUTO-ENTMCNC: 30.2 G/DL (ref 31.5–35.7)
MCV RBC AUTO: 78.6 FL (ref 79–97)
MONOCYTES # BLD AUTO: 0.38 10*3/MM3 (ref 0.1–0.9)
MONOCYTES NFR BLD AUTO: 5.9 % (ref 5–12)
NEUTROPHILS NFR BLD AUTO: 4.62 10*3/MM3 (ref 1.7–7)
NEUTROPHILS NFR BLD AUTO: 71.3 % (ref 42.7–76)
NRBC BLD AUTO-RTO: 0 /100 WBC (ref 0–0.2)
PLATELET # BLD AUTO: 267 10*3/MM3 (ref 140–450)
PMV BLD AUTO: 10.4 FL (ref 6–12)
POTASSIUM SERPL-SCNC: 3.7 MMOL/L (ref 3.5–5.2)
PROT SERPL-MCNC: 7 G/DL (ref 6–8.5)
RBC # BLD AUTO: 4.01 10*6/MM3 (ref 3.77–5.28)
SODIUM SERPL-SCNC: 136 MMOL/L (ref 136–145)
WBC # BLD AUTO: 6.47 10*3/MM3 (ref 3.4–10.8)

## 2021-07-19 PROCEDURE — 85025 COMPLETE CBC W/AUTO DIFF WBC: CPT

## 2021-07-19 PROCEDURE — 99213 OFFICE O/P EST LOW 20 MIN: CPT | Performed by: NURSE PRACTITIONER

## 2021-07-19 PROCEDURE — 36415 COLL VENOUS BLD VENIPUNCTURE: CPT

## 2021-07-19 PROCEDURE — 80053 COMPREHEN METABOLIC PANEL: CPT

## 2021-07-19 PROCEDURE — 85651 RBC SED RATE NONAUTOMATED: CPT

## 2021-07-19 PROCEDURE — 99214 OFFICE O/P EST MOD 30 MIN: CPT | Performed by: INTERNAL MEDICINE

## 2021-07-19 RX ORDER — DEXAMETHASONE 1 MG
1 TABLET ORAL 2 TIMES DAILY WITH MEALS
Qty: 60 TABLET | Refills: 2 | Status: SHIPPED | OUTPATIENT
Start: 2021-07-19 | End: 2021-08-13

## 2021-07-19 NOTE — PROGRESS NOTES
[facility] GROUP HEMATOLOGY AND ONCOLOGY  [address]  [facility] [address] [phone]    Chief Complaint  Anemia, unspecified type (BM BX AND HAD INFUSION WAS LAST WEDS.)       She is here today mainly for anemia. She had a long HX of Fe deficiency and could not tolerate oral Fe tablets. She had a recent fast weight gain and has been very much stressed out. Her recent HGB was only 8 ranges and other related tests did show evidence of Fe deficiency. 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 Over the last 6 month she has been feeling very tired     Of note she had the DX of abd wall melanoma in 2010 and she has had wide local resection of mid abd skin/soft tissue in addition to deep resection close to abd muscle wall. This is based on the fact that the path report did show potential muscle invasion.     She is now concerned that she did not have surveillance study and also never had discussion for ADJ TX while she does understand that the chance for recurrent melanoma is significant    Today she returns as a FU after IV FE injection. She also has been followed by Dr aguirre weight reduction porgram    Oncology/Hematology History    No history exists.         PAST MEDICAL HISTORY:  ALLERGIES:  Allergies   Allergen Reactions   • Neosporin Plus Max St Rash     CURRENT MEDICATIONS:  Outpatient Encounter Medications as of 7/19/2021   Medication Sig Dispense Refill   • busPIRone (BUSPAR) 10 MG tablet Take 10 mg by mouth 3 (Three) Times a Day.     • ferrous sulfate 325 (65 FE) MG tablet Take 325 mg by mouth Daily With Breakfast. Every other day     • hydroCHLOROthiazide (HYDRODIURIL) 25 MG tablet Take 25 mg by mouth Daily.     • lisinopril (PRINIVIL,ZESTRIL) 40 MG tablet Take 40 mg by mouth Daily.     • pantoprazole (PROTONIX) 20 MG EC tablet Take 20 mg by  mouth Daily.     • sertraline (ZOLOFT) 25 MG tablet Take 25 mg by mouth Daily.     • simvastatin (Zocor) 20 MG tablet Take 20 mg by mouth Every Night.     • temazepam (RESTORIL) 15 MG capsule Take 15 mg by mouth At Night As Needed for Sleep.     • dexamethasone (DECADRON) 1 MG tablet Take 1 tablet by mouth 2 (Two) Times a Day With Meals for 30 days. 60 tablet 2   • [DISCONTINUED] atorvastatin (LIPITOR) 20 MG tablet Take 20 mg by mouth Daily.       No facility-administered encounter medications on file as of 7/19/2021.     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   • Class 1 obesity due to excess calories with serious comorbidity and body mass index (BMI) of 33.0 to 33.9 in adult E66.09, Z68.33   • GERD (gastroesophageal reflux disease) K21.9   • Malignant melanoma of overlapping sites (CMS/HCC) C43.8   • High cholesterol E78.00     SURGERIES:  Past Surgical History:   Procedure Laterality Date   • APPENDECTOMY     • CHOLECYSTECTOMY  2017   • LYMPH NODE BIOPSY     • POSTPARTUM TUBAL LIGATION     • SINUS SURGERY     • SKIN CANCER EXCISION       HEALTH MAINTENANCE ITEMS:  Health Maintenance Due   Topic Date Due   • MAMMOGRAM  Never done   • COLORECTAL CANCER SCREENING  Never done   • ANNUAL PHYSICAL  Never done   • COVID-19 Vaccine (1) Never done   • TDAP/TD VACCINES (1 - Tdap) Never done   • ZOSTER VACCINE (1 of 2) Never done   • HEPATITIS C SCREENING  Never done   • PAP SMEAR  Never done   • LIPID PANEL  Never done       <no information>  Last Completed Colonoscopy     This patient has no relevant Health Maintenance data.          There is no immunization history on file for this patient.  Last Completed Mammogram     This patient has no relevant Health Maintenance data.            FAMILY HISTORY:  Family History   Problem Relation Age of Onset   • Heart  "disease Father    • Lung cancer Father      SOCIAL HISTORY:  Social History     Socioeconomic History   • Marital status:      Spouse name: Not on file   • Number of children: Not on file   • Years of education: Not on file   • Highest education level: Not on file   Tobacco Use   • Smoking status: Never Smoker   • Smokeless tobacco: Never Used       REVIEW OF SYSTEMS:  Review of Systems   Constitutional: Positive for fatigue and unexpected weight loss.   HENT: Negative.    Eyes: Negative.    Respiratory: Negative.    Cardiovascular: Negative.    Gastrointestinal: Negative.    Endocrine: Negative.    Genitourinary: Negative.    Musculoskeletal: Negative.    Allergic/Immunologic: Negative.    Neurological: Negative.    Hematological: Negative.    Psychiatric/Behavioral: Negative.        /76   Pulse 71   Temp 97.5 °F (36.4 °C)   Resp 16   Ht 168.3 cm (66.25\")   Wt 90.2 kg (198 lb 14.4 oz)   SpO2 97%   Breastfeeding No   BMI 31.86 kg/m²  Body surface area is 2 meters squared.  Pain Score    07/19/21 1259   PainSc: 0-No pain       Objective   Vital Signs:   /76   Pulse 71   Temp 97.5 °F (36.4 °C)   Resp 16   Ht 168.3 cm (66.25\")   Wt 90.2 kg (198 lb 14.4 oz)   SpO2 97%   Breastfeeding No   BMI 31.86 kg/m²  Body surface area is 2 meters squared.  Pain Score    07/19/21 1259   PainSc: 0-No pain     Pilar Mesa reports a pain score of 0.  Given her pain assessment as noted, treatment options were discussed and the following options were decided upon as a follow-up plan to address the patient's pain: .      Patient's Body mass index is 31.86 kg/m². indicating that she is     Physical Exam  Constitutional:       Appearance: Normal appearance.   HENT:      Head: Atraumatic.      Mouth/Throat:      Mouth: Mucous membranes are dry.   Eyes:      Extraocular Movements: Extraocular movements intact.   Cardiovascular:      Rate and Rhythm: Normal rate.   Pulmonary:      Effort: Pulmonary " effort is normal.   Abdominal:      Palpations: Abdomen is soft.   Musculoskeletal:         General: Normal range of motion.      Cervical back: Normal range of motion.   Skin:     General: Skin is warm and dry.   Psychiatric:         Mood and Affect: Mood normal.         Behavior: Behavior normal.            Result Review :     LABS    Lab Results - Last 18 Months   Lab Units 07/19/21  1248 07/07/21  0938 06/15/21  0910   HEMOGLOBIN g/dL 9.5* 10.6* 10.5*   HEMATOCRIT % 31.5* 34.5 36.7   MCV fL 78.6* 75.3* 77.3*   WBC 10*3/mm3 6.47 4.92 5.98   RDW % 20.8* 20.4* 22.2*   MPV fL 10.4 10.9 10.5   PLATELETS 10*3/mm3 267 252 332   IMM GRAN % % 0.3 0.0 0.2   NEUTROS ABS 10*3/mm3 4.62 2.99 3.92   LYMPHS ABS 10*3/mm3 1.22 1.25 1.32   MONOS ABS 10*3/mm3 0.38 0.39 0.42   EOS ABS 10*3/mm3 0.18 0.24 0.26   BASOS ABS 10*3/mm3 0.05 0.05 0.05   IMMATURE GRANS (ABS) 10*3/mm3 0.02 0.00 0.01   NRBC /100 WBC 0.0 0.0 0.0       Lab Results - Last 18 Months   Lab Units 07/19/21  1248 06/15/21  0910   GLUCOSE mg/dL 102* 117*   SODIUM mmol/L 136 136   POTASSIUM mmol/L 3.7 4.0   CO2 mmol/L 23.0 26.0   CHLORIDE mmol/L 101 101   ANION GAP mmol/L 12.0 9.0   CREATININE mg/dL 0.64 0.60   BUN mg/dL 14 13   BUN / CREAT RATIO  21.9 21.7   CALCIUM mg/dL 9.7 10.0   EGFR IF NONAFRICN AM mL/min/1.73 98 105   ALK PHOS U/L 72 103   TOTAL PROTEIN g/dL 7.0 7.5   ALT (SGPT) U/L 20 17   AST (SGOT) U/L 20 17   BILIRUBIN mg/dL 0.2 0.3   ALBUMIN g/dL 4.20 4.10   GLOBULIN gm/dL 2.8 3.4       Lab Results - Last 18 Months   Lab Units 06/16/21  0701   REFERENCE LAB REPORT  See Attached Result        Lab Results - Last 18 Months   Lab Units 06/15/21  0910   IRON mcg/dL 29*   TIBC mcg/dL 539*   IRON SATURATION % 5*   FERRITIN ng/mL 13.07   FOLATE ng/mL 16.70              Assessment and Plan    Problem List Items Addressed This Visit        Other    Iron deficiency    Current Assessment & Plan     1. She feels stronger after IV FE  2. She likely need another IV FE in  the near future         Anemia - Primary    Current Assessment & Plan     1. She had IV FE  2. She still has low HG 9.8  3. She clearly has anemia of chronic DS  4. We will start PO steroid         Relevant Orders    CBC & Differential    Comprehensive Metabolic Panel    Malignant melanoma of overlapping sites (CMS/HCC)    Current Assessment & Plan     1. We have re-discussed about FU restaging scan  2. We all understand that this scan can not be approved from [employer]  3. She is under surveillance skin exam program             In summary  1. She has Fe def anemia  2. She had IV FE  3. She will start dexa for anemia of chronic DS  4. I will see her in 2 weeks    Follow Up     Patient was given instructions and counseling regarding her condition or for health maintenance advice. Please see specific information pulled into the AVS if appropriate.

## 2021-07-20 NOTE — ASSESSMENT & PLAN NOTE
1. She had IV FE  2. She still has low HG 9.8  3. She clearly has anemia of chronic DS  4. We will start PO steroid

## 2021-07-20 NOTE — ASSESSMENT & PLAN NOTE
1. We have re-discussed about FU restaging scan  2. We all understand that this scan can not be approved from [employer]  3. She is under surveillance skin exam program

## 2021-08-02 DIAGNOSIS — C43.8 MALIGNANT MELANOMA OF OVERLAPPING SITES (HCC): Primary | ICD-10-CM

## 2021-08-02 DIAGNOSIS — D50.9 IRON DEFICIENCY ANEMIA, UNSPECIFIED IRON DEFICIENCY ANEMIA TYPE: ICD-10-CM

## 2021-08-03 ENCOUNTER — APPOINTMENT (OUTPATIENT)
Dept: LAB | Facility: HOSPITAL | Age: 51
End: 2021-08-03

## 2021-08-05 ENCOUNTER — LAB (OUTPATIENT)
Dept: ONCOLOGY | Facility: CLINIC | Age: 51
End: 2021-08-05

## 2021-08-05 ENCOUNTER — OFFICE VISIT (OUTPATIENT)
Dept: ONCOLOGY | Facility: CLINIC | Age: 51
End: 2021-08-05

## 2021-08-05 VITALS
TEMPERATURE: 97.4 F | SYSTOLIC BLOOD PRESSURE: 118 MMHG | BODY MASS INDEX: 30.04 KG/M2 | OXYGEN SATURATION: 98 % | HEIGHT: 68 IN | HEART RATE: 96 BPM | DIASTOLIC BLOOD PRESSURE: 82 MMHG | RESPIRATION RATE: 16 BRPM | WEIGHT: 198.2 LBS

## 2021-08-05 DIAGNOSIS — D50.9 IRON DEFICIENCY ANEMIA, UNSPECIFIED IRON DEFICIENCY ANEMIA TYPE: ICD-10-CM

## 2021-08-05 DIAGNOSIS — K21.9 GASTROESOPHAGEAL REFLUX DISEASE, UNSPECIFIED WHETHER ESOPHAGITIS PRESENT: ICD-10-CM

## 2021-08-05 DIAGNOSIS — C43.8 MALIGNANT MELANOMA OF OVERLAPPING SITES (HCC): ICD-10-CM

## 2021-08-05 DIAGNOSIS — C43.59 MALIGNANT MELANOMA OF TORSO EXCLUDING BREAST (HCC): Primary | ICD-10-CM

## 2021-08-05 DIAGNOSIS — E61.1 IRON DEFICIENCY: ICD-10-CM

## 2021-08-05 PROBLEM — K90.9 MALABSORPTION SYNDROME: Status: ACTIVE | Noted: 2021-08-05

## 2021-08-05 LAB
ALBUMIN SERPL-MCNC: 4.4 G/DL (ref 3.5–5.2)
ALBUMIN/GLOB SERPL: 1.6 G/DL
ALP SERPL-CCNC: 71 U/L (ref 39–117)
ALT SERPL W P-5'-P-CCNC: 22 U/L (ref 1–33)
ANION GAP SERPL CALCULATED.3IONS-SCNC: 11 MMOL/L (ref 5–15)
AST SERPL-CCNC: 15 U/L (ref 1–32)
BASOPHILS # BLD AUTO: 0.04 10*3/MM3 (ref 0–0.2)
BASOPHILS NFR BLD AUTO: 0.4 % (ref 0–1.5)
BILIRUB SERPL-MCNC: 0.2 MG/DL (ref 0–1.2)
BUN SERPL-MCNC: 15 MG/DL (ref 6–20)
BUN/CREAT SERPL: 25 (ref 7–25)
CALCIUM SPEC-SCNC: 9.7 MG/DL (ref 8.6–10.5)
CHLORIDE SERPL-SCNC: 101 MMOL/L (ref 98–107)
CO2 SERPL-SCNC: 23 MMOL/L (ref 22–29)
CREAT SERPL-MCNC: 0.6 MG/DL (ref 0.57–1)
DEPRECATED RDW RBC AUTO: 53.6 FL (ref 37–54)
EOSINOPHIL # BLD AUTO: 0.07 10*3/MM3 (ref 0–0.4)
EOSINOPHIL NFR BLD AUTO: 0.8 % (ref 0.3–6.2)
ERYTHROCYTE [DISTWIDTH] IN BLOOD BY AUTOMATED COUNT: 18.4 % (ref 12.3–15.4)
GFR SERPL CREATININE-BSD FRML MDRD: 105 ML/MIN/1.73
GLOBULIN UR ELPH-MCNC: 2.8 GM/DL
GLUCOSE SERPL-MCNC: 112 MG/DL (ref 65–99)
HCT VFR BLD AUTO: 34.6 % (ref 34–46.6)
HGB BLD-MCNC: 10.4 G/DL (ref 12–15.9)
IMM GRANULOCYTES # BLD AUTO: 0.01 10*3/MM3 (ref 0–0.05)
IMM GRANULOCYTES NFR BLD AUTO: 0.1 % (ref 0–0.5)
LYMPHOCYTES # BLD AUTO: 0.86 10*3/MM3 (ref 0.7–3.1)
LYMPHOCYTES NFR BLD AUTO: 9.3 % (ref 19.6–45.3)
MCH RBC QN AUTO: 23.9 PG (ref 26.6–33)
MCHC RBC AUTO-ENTMCNC: 30.1 G/DL (ref 31.5–35.7)
MCV RBC AUTO: 79.5 FL (ref 79–97)
MONOCYTES # BLD AUTO: 0.51 10*3/MM3 (ref 0.1–0.9)
MONOCYTES NFR BLD AUTO: 5.5 % (ref 5–12)
NEUTROPHILS NFR BLD AUTO: 7.77 10*3/MM3 (ref 1.7–7)
NEUTROPHILS NFR BLD AUTO: 83.9 % (ref 42.7–76)
PLATELET # BLD AUTO: 283 10*3/MM3 (ref 140–450)
PMV BLD AUTO: 9.4 FL (ref 6–12)
POTASSIUM SERPL-SCNC: 3.7 MMOL/L (ref 3.5–5.2)
PROT SERPL-MCNC: 7.2 G/DL (ref 6–8.5)
RBC # BLD AUTO: 4.35 10*6/MM3 (ref 3.77–5.28)
SODIUM SERPL-SCNC: 135 MMOL/L (ref 136–145)
WBC # BLD AUTO: 9.26 10*3/MM3 (ref 3.4–10.8)

## 2021-08-05 PROCEDURE — 99214 OFFICE O/P EST MOD 30 MIN: CPT | Performed by: NURSE PRACTITIONER

## 2021-08-05 PROCEDURE — 80053 COMPREHEN METABOLIC PANEL: CPT | Performed by: NURSE PRACTITIONER

## 2021-08-05 PROCEDURE — 85025 COMPLETE CBC W/AUTO DIFF WBC: CPT | Performed by: NURSE PRACTITIONER

## 2021-08-05 RX ORDER — SODIUM CHLORIDE 9 MG/ML
250 INJECTION, SOLUTION INTRAVENOUS ONCE
OUTPATIENT
Start: 2021-09-14

## 2021-08-05 RX ORDER — DIPHENHYDRAMINE HYDROCHLORIDE 50 MG/ML
50 INJECTION INTRAMUSCULAR; INTRAVENOUS AS NEEDED
OUTPATIENT
Start: 2021-09-07

## 2021-08-05 RX ORDER — ACETAMINOPHEN 325 MG/1
650 TABLET ORAL ONCE
OUTPATIENT
Start: 2021-09-07

## 2021-08-05 RX ORDER — FAMOTIDINE 10 MG/ML
20 INJECTION, SOLUTION INTRAVENOUS AS NEEDED
OUTPATIENT
Start: 2021-09-07

## 2021-08-05 RX ORDER — DIPHENHYDRAMINE HYDROCHLORIDE 50 MG/ML
50 INJECTION INTRAMUSCULAR; INTRAVENOUS AS NEEDED
OUTPATIENT
Start: 2021-09-14

## 2021-08-05 RX ORDER — SODIUM CHLORIDE 9 MG/ML
250 INJECTION, SOLUTION INTRAVENOUS ONCE
OUTPATIENT
Start: 2021-09-07

## 2021-08-05 RX ORDER — FAMOTIDINE 10 MG/ML
20 INJECTION, SOLUTION INTRAVENOUS AS NEEDED
OUTPATIENT
Start: 2021-09-14

## 2021-08-05 RX ORDER — ACETAMINOPHEN 325 MG/1
650 TABLET ORAL ONCE
OUTPATIENT
Start: 2021-09-14

## 2021-08-05 NOTE — PROGRESS NOTES
[facility]  HEMATOLOGY & ONCOLOGY    [facility] ON[facility] HEMATOLOGY AND ONCOLOGY  [address] [place] [address] [address]  [facility] [address]  [phone]    Patient Name: Pilar Mesa  Encounter Date: 08/05/2021  YOB: 1970  Patient Number: [id number]    Chief Complaint   Patient presents with   • Anemia     HERE FOR F/U       REASON FOR VISIT: Mrs. Mesa is a 51-year-old female patient who was referred to our office back in  June 2021 by her primary care provider Dr. Griggs for anemia.  She was initially seen by nam Perez and who is no longer with our practice.      A work-up was completed by Dr Tamez in June 2021 and found on the day of initial consultation her hemoglobin was 10.5, hematocrit 36.7, MCV 77.3.  Platelet count 332,000 and white count of 5.98.  Her CMP was normal except for nonfasting glucose of 117.  Iron profile found saturation of 5% and ferritin was only 13.07. Sed rate normal at 19.  Her vitamin B12 level was 978 with a normal folate of 16.7.      Dr. Tamez had performed a peripheral blood flow cytometry that was negative as well as a bone marrow biopsy that was also s within normal range.  The bone marrow biopsy on 7/7/2021 found a normocellular bone marrow estimated at 50% cellularity.  Myeloid to erythroid ratio appears to be within normal limits in the marrow.  Maturing myelopoiesis without maturation arrest was noted.  No obvious an increase in plasma cells or lymphocytes in the marrow.  There was adequate megakaryocytes.  No stainable iron was noted.  There is no evidence of metastatic carcinoma or granulomatosis disease.  Flow cytometry was normal as well as cytogenetics.    Dr. Tamez gave her 1 dose of Venofer 200 mg IV on 7/13/2021.  Repeat hemoglobin following administration was down to 9.5 with hematocrit of 31.5 on 7/19/2021.  Dr. Tamez also placed the patient on dexamethasone 1 mg twice a day.  Patient states she has cut  it back to 1 a day as she was not tolerating them twice a day.  The reason for prescribing dexamethasone is not clear.    She has now followed back up today with me for her anemia.  Her hemoglobin today is at 10.4 with hematocrit of 34.6.  Her white count is normal at 9.26 and platelets 283,000.  CMP is also normal again except for nonfasting glucose of 112.     She states today that she does feel fatigued and tired.  Does not have much energy.  She has no obvious blood loss except for her normal menstruation for which she states is sporadic.  She states her periods are not regular and when she does have a cycle,it usually lasts about 7 days.  She was just seen by her GYN this past Monday  with a good report. She had a colonoscopy about 2 years ago by Dr. Gilmore which she states was normal.  She has had no change in her bowel habits or color.  She has had no fever chills or night sweats.  No abdominal pain nausea or vomiting.  No shortness of breath or cough.    PAST MEDICAL HISTORY:  ALLERGIES:  Allergies   Allergen Reactions   • Neosporin Plus Max St Rash       CURRENT MEDICATIONS:  Outpatient Encounter Medications as of 8/5/2021   Medication Sig Dispense Refill   • busPIRone (BUSPAR) 10 MG tablet Take 10 mg by mouth 3 (Three) Times a Day.     • dexamethasone (DECADRON) 1 MG tablet Take 1 tablet by mouth 2 (Two) Times a Day With Meals for 30 days. 60 tablet 2   • hydroCHLOROthiazide (HYDRODIURIL) 25 MG tablet Take 25 mg by mouth Daily.     • lisinopril (PRINIVIL,ZESTRIL) 40 MG tablet Take 40 mg by mouth Daily.     • pantoprazole (PROTONIX) 20 MG EC tablet Take 20 mg by mouth Daily.     • sertraline (ZOLOFT) 25 MG tablet Take 25 mg by mouth Daily.     • simvastatin (Zocor) 20 MG tablet Take 20 mg by mouth Every Night.     • temazepam (RESTORIL) 15 MG capsule Take 15 mg by mouth At Night As Needed for Sleep.     • [DISCONTINUED] ferrous sulfate 325 (65 FE) MG tablet Take 325 mg by mouth Daily With Breakfast. Every  other day       No facility-administered encounter medications on file as of 8/5/2021.       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   • Class 1 obesity due to excess calories with serious comorbidity and body mass index (BMI) of 33.0 to 33.9 in adult E66.09, Z68.33   • GERD (gastroesophageal reflux disease) K21.9   • Malignant melanoma of overlapping sites (CMS/HCC) C43.8   • High cholesterol E78.00       SURGERIES:  Past Surgical History:   Procedure Laterality Date   • APPENDECTOMY     • CHOLECYSTECTOMY  2017   • LYMPH NODE BIOPSY     • POSTPARTUM TUBAL LIGATION     • SINUS SURGERY     • SKIN CANCER EXCISION         HEALTH MAINTENANCE ITEMS:    Last Completed Colonoscopy     2 years ago per patient at [facility] and normal           Last Completed Mammogram     Patient states mammogram was completed at [facility] this month and normal          FAMILY HISTORY:  Family History   Problem Relation Age of Onset   • Heart disease Father    • Lung cancer Father        SOCIAL HISTORY:  Social History     Socioeconomic History   • Marital status:      Spouse name: Not on file   • Number of children: Not on file   • Years of education: Not on file   • Highest education level: Not on file   Tobacco Use   • Smoking status: Never Smoker   • Smokeless tobacco: Never Used       REVIEW OF SYSTEMS:  Review of Systems   Constitutional: Positive for fatigue. Negative for activity change, appetite change (Wears out easily), chills, diaphoresis, fever and unexpected weight loss.   HENT: Negative for nosebleeds, sinus pressure, sore throat and voice change.    Eyes: Negative for blurred vision, double vision and visual disturbance.   Respiratory: Negative for cough and shortness of breath.    Cardiovascular: Negative for chest pain, palpitations and  "leg swelling.   Gastrointestinal: Negative for abdominal pain, anal bleeding, blood in stool, constipation, diarrhea, nausea and vomiting.   Endocrine: Negative for heat intolerance, polydipsia and polyuria.   Genitourinary: Negative for dysuria, frequency, hematuria, urgency and urinary incontinence.   Musculoskeletal: Negative for arthralgias and myalgias.   Skin: Negative for rash and skin lesions.   Neurological: Negative for dizziness, weakness and headache.   Hematological: Negative for adenopathy. Does not bruise/bleed easily.   Psychiatric/Behavioral: Negative for sleep disturbance and depressed mood.       /82   Pulse 96   Temp 97.4 °F (36.3 °C) (Temporal)   Resp 16   Ht 172.7 cm (68\")   Wt 89.9 kg (198 lb 3.2 oz)   SpO2 98%   Breastfeeding No   BMI 30.14 kg/m²  Body surface area is 2.04 meters squared.  Pain Score    08/05/21 1004   PainSc: 0-No pain       Physical Exam:  Physical Exam  Vitals reviewed.   Constitutional:       Appearance: Normal appearance. She is well-developed.   HENT:      Head: Atraumatic.   Eyes:      General: No scleral icterus.     Pupils: Pupils are equal, round, and reactive to light.   Neck:      Trachea: Trachea normal.   Cardiovascular:      Rate and Rhythm: Normal rate and regular rhythm.      Pulses: Normal pulses.      Heart sounds: No murmur heard.     Pulmonary:      Effort: Pulmonary effort is normal.      Breath sounds: Normal breath sounds. No wheezing, rhonchi or rales.   Abdominal:      Palpations: Abdomen is soft.      Tenderness: There is no abdominal tenderness. There is no guarding or rebound.   Musculoskeletal:      Cervical back: Neck supple.   Lymphadenopathy:      Cervical: No cervical adenopathy.      Upper Body:      Right upper body: No supraclavicular adenopathy.      Left upper body: No supraclavicular adenopathy.   Skin:     General: Skin is warm and dry.   Neurological:      General: No focal deficit present.      Mental Status: She is " alert and oriented to person, place, and time.      Sensory: No sensory deficit.   Psychiatric:         Mood and Affect: Mood normal.         Behavior: Behavior normal.         Pilar Mesa reports a pain score of 0.    Patient's Body mass index is 30.14 kg/m². indicating that she is obese (BMI >30). We discussed deferring to pcp..      LABS    Lab Results - Last 18 Months   Lab Units 08/05/21  0959 07/19/21  1248 07/07/21  0938 06/15/21  0910   HEMOGLOBIN g/dL 10.4* 9.5* 10.6* 10.5*   HEMATOCRIT % 34.6 31.5* 34.5 36.7   MCV fL 79.5 78.6* 75.3* 77.3*   WBC 10*3/mm3 9.26 6.47 4.92 5.98   RDW % 18.4* 20.8* 20.4* 22.2*   MPV fL 9.4 10.4 10.9 10.5   PLATELETS 10*3/mm3 283 267 252 332   IMM GRAN % % 0.1 0.3 0.0 0.2   NEUTROS ABS 10*3/mm3 7.77* 4.62 2.99 3.92   LYMPHS ABS 10*3/mm3 0.86 1.22 1.25 1.32   MONOS ABS 10*3/mm3 0.51 0.38 0.39 0.42   EOS ABS 10*3/mm3 0.07 0.18 0.24 0.26   BASOS ABS 10*3/mm3 0.04 0.05 0.05 0.05   IMMATURE GRANS (ABS) 10*3/mm3 0.01 0.02 0.00 0.01   NRBC /100 WBC  --  0.0 0.0 0.0       Lab Results - Last 18 Months   Lab Units 07/19/21  1248 06/15/21  0910   GLUCOSE mg/dL 102* 117*   SODIUM mmol/L 136 136   POTASSIUM mmol/L 3.7 4.0   CO2 mmol/L 23.0 26.0   CHLORIDE mmol/L 101 101   ANION GAP mmol/L 12.0 9.0   CREATININE mg/dL 0.64 0.60   BUN mg/dL 14 13   BUN / CREAT RATIO  21.9 21.7   CALCIUM mg/dL 9.7 10.0   EGFR IF NONAFRICN AM mL/min/1.73 98 105   ALK PHOS U/L 72 103   TOTAL PROTEIN g/dL 7.0 7.5   ALT (SGPT) U/L 20 17   AST (SGOT) U/L 20 17   BILIRUBIN mg/dL 0.2 0.3   ALBUMIN g/dL 4.20 4.10   GLOBULIN gm/dL 2.8 3.4       Lab Results - Last 18 Months   Lab Units 06/16/21  0701   REFERENCE LAB REPORT  See Attached Result        Lab Results - Last 18 Months   Lab Units 06/15/21  0910   IRON mcg/dL 29*   TIBC mcg/dL 539*   IRON SATURATION % 5*   FERRITIN ng/mL 13.07   FOLATE ng/mL 16.70     ASSESSMENT  1.  Iron deficiency anemia with baseline hemoglobin of 10.5 and MCV of 77.3.  She has a  baseline ferritin of 13 and iron saturation of 5% as of 6/15/2021.  She was treated with 1 dose of Venofer 200 mg on 7/13/2021.  Hemoglobin currently 10.4 with hematocrit of 34.6.  Patient's baseline ferritin and iron saturation were quite low and therefore I recommend her undergoing further iron therapy.  She would like to have this completed at [facility] therefore will need to use Nulecit.  Her iron deficiency anemia is likely related to menstrual loss as work-up otherwise has been unremarkable.  2.  Hypertension controlled with lisinopril and hydrochlorothiazide.  Blood pressure is currently 118/82  3.  GERD symptoms controlled with Protonix  4.  Depression and anxiety on Zoloft /BuSpar  5.  Patient with history of a melanoma to the abdominal wall diagnosed in September 2010.  Breslow thickness 0.63 mm, Julio César's level 4.  No histologic evidence of residual in situ or invasive malignant melanoma identified in the surgical margins.  Benign intramammary lymph node demonstrating no evidence of disease.  Patient follows with dermatology.    PLAN  1.  Reviewed lab results with patient today that were available.  2.  Reviewed the entire work-up that was obtained by Dr. Tamez and explained results  3.  Advised the patient that she needs additional iron infusions and that we can provide those at [facility] as it is more convenient for the patient.  She will need Nulecit 125 mg IV weekly x4.  4.  Advised patient to stop dexamethasone  5.  Advised patient to continue to follow with primary care provider and other specialists  6.  Patient to return to the [place] office in 8 weeks for lab work.  She will need a CBC, iron profile and ferritin  7.  Patient return to [place] office for follow-up in 12 weeks at that time she will need CBC, CMP, iron profile and ferritin    GLO An  08/05/2021

## 2021-08-11 ENCOUNTER — TELEPHONE (OUTPATIENT)
Dept: BARIATRICS/WEIGHT MGMT | Facility: CLINIC | Age: 51
End: 2021-08-11

## 2021-08-13 ENCOUNTER — OFFICE VISIT (OUTPATIENT)
Dept: BARIATRICS/WEIGHT MGMT | Facility: CLINIC | Age: 51
End: 2021-08-13

## 2021-08-13 ENCOUNTER — NUTRITION (OUTPATIENT)
Dept: BARIATRICS/WEIGHT MGMT | Facility: HOSPITAL | Age: 51
End: 2021-08-13

## 2021-08-13 VITALS
HEIGHT: 66 IN | SYSTOLIC BLOOD PRESSURE: 118 MMHG | OXYGEN SATURATION: 98 % | WEIGHT: 196.6 LBS | DIASTOLIC BLOOD PRESSURE: 82 MMHG | BODY MASS INDEX: 31.6 KG/M2 | TEMPERATURE: 98.8 F | HEART RATE: 86 BPM

## 2021-08-13 DIAGNOSIS — E66.09 CLASS 1 OBESITY DUE TO EXCESS CALORIES WITH SERIOUS COMORBIDITY AND BODY MASS INDEX (BMI) OF 33.0 TO 33.9 IN ADULT: Primary | ICD-10-CM

## 2021-08-13 DIAGNOSIS — E78.00 HIGH CHOLESTEROL: ICD-10-CM

## 2021-08-13 PROCEDURE — 99213 OFFICE O/P EST LOW 20 MIN: CPT | Performed by: SURGERY

## 2021-08-13 NOTE — PROGRESS NOTES
Nutrition Services    Patient Name:  Pilar Mesa  YOB: 1970  MRN: [id number]  Admit Date:  (Not on file)    NUTRITION BARIATRIC/MWL NOTE       Anthropometrics   Height: 66.25 in  Weight: 196 lbs 9.6 oz  BMI: 31.49    Nutrition Recall  Eating ___4___ meals daily   Meeting protein daily goal  Snacking-none  Making healthier choices  Limited sweet intake-none except for sugar free  Monitoring portions  Drinking with meals   Drinking carbonated beverages-none  Drinking less than 64 fluid ounces-60 oz give or take    Education    Review of 4 meal per day diet plan  Reinforce Nutritional Needs for Surgery  Reinforce Nutritional Needs for MWL    Nutrition Goals   Continue diet changes  Eat __4___ meals per day with protein  Protein goal: 65gms or 80 gms   discussed protein guidelines for shakes and bar  Healthier food choices  Portion control / Use smaller plate or measuring cup   Increase fluid intake to 64 ounces per day      Electronically signed by:  Elsi Rosario  08/13/21 09:40 CDT

## 2021-08-13 NOTE — PROGRESS NOTES
"Patient Care Team:  Rahul Griggs DO as PCP - General (Family Medicine)    Reason for Visit:  Surgical Weight loss      Subjective   Pilar Mesa is a 51 y.o. female.     Pilar is here for follow-up and continued medical management of her morbid obesity.  She is currently on a prescription diet.  Pilar previously was to apply dietary changes such as following the meal plan as directed.  She admits to not following the dietary prescription this past month consistently.  As a result she had no significant change in her weight weight since her last visit. She attributes these actions due to her being on vacation but also she was given steroids which increased her appetite for processed carbohydrates.    Review Of Systems:  General ROS: negative  Respiratory ROS: no cough, shortness of breath, or wheezing  Cardiovascular ROS: no chest pain or dyspnea on exertion  Gastrointestinal ROS: no abdominal pain, change in bowel habits, or black or bloody stools  positive for - diarrhea    The following portions of the patient's history were reviewed and updated as appropriate: allergies, current medications, past family history, past medical history, past social history, past surgical history and problem list.    Objective   /82 (BP Location: Right arm, Patient Position: Sitting, Cuff Size: Adult)   Pulse 86   Temp 98.8 °F (37.1 °C)   Ht 168.3 cm (66.25\")   Wt 89.2 kg (196 lb 9.6 oz)   SpO2 98%   BMI 31.49 kg/m²       08/13/21  0820   Weight: 89.2 kg (196 lb 9.6 oz)       General Appearance:  awake, alert, oriented, in no acute distress    Assessment/Plan     Encounter Diagnoses   Name Primary?   • Class 1 obesity due to excess calories with serious comorbidity and body mass index (BMI) of 33.0 to 33.9 in adult Yes   • High cholesterol        Pilar Mesa was seen today for follow-up, obesity, nutrition counseling and weight loss.  She had no significant change in weight since her last " visit.  Today we discussed healthy changes in lifestyle, diet, and exercise. Dietician consultation obtained.  Pilar Mesa had received handouts to her explaining the recommendation on portion sizes/appetite control/reading nutrition labels.   Intensive behavioral therapy for obesity was done today as well.   Goals for this month are: I have encouraged the patient to reattempt following the dietary prescription now that she is off of her steroids and back on. She should also create a food journal of at least 21 consecutive days for us to review.    Follow up in one month for a weight recheck.

## 2021-08-23 ENCOUNTER — TELEPHONE (OUTPATIENT)
Dept: ONCOLOGY | Facility: CLINIC | Age: 51
End: 2021-08-23

## 2021-08-23 NOTE — TELEPHONE ENCOUNTER
Caller: ZACHERY CANO    Relationship: SELF    Best call back number: [phone]    What is the best time to reach you: ANY    Who are you requesting to speak with (clinical staff, provider,  specific staff member): VIANNEY ALVAREZ    What was the call regarding: PT STATES SHE HAS BEEN WAITING ON HER INSURANCE TO APPROVE IRON INFUSIONS AND IS CALLING TO CHECK THE STATUS OF THIS    Do you require a callback: YES

## 2021-09-13 ENCOUNTER — OFFICE VISIT (OUTPATIENT)
Dept: BARIATRICS/WEIGHT MGMT | Facility: CLINIC | Age: 51
End: 2021-09-13

## 2021-09-13 VITALS
WEIGHT: 189.2 LBS | OXYGEN SATURATION: 97 % | SYSTOLIC BLOOD PRESSURE: 122 MMHG | DIASTOLIC BLOOD PRESSURE: 84 MMHG | HEART RATE: 76 BPM | HEIGHT: 66 IN | BODY MASS INDEX: 30.41 KG/M2 | TEMPERATURE: 98.9 F

## 2021-09-13 DIAGNOSIS — E78.00 HIGH CHOLESTEROL: ICD-10-CM

## 2021-09-13 DIAGNOSIS — E66.09 CLASS 1 OBESITY DUE TO EXCESS CALORIES WITH SERIOUS COMORBIDITY AND BODY MASS INDEX (BMI) OF 33.0 TO 33.9 IN ADULT: Primary | ICD-10-CM

## 2021-09-13 DIAGNOSIS — K21.00 GASTROESOPHAGEAL REFLUX DISEASE WITH ESOPHAGITIS WITHOUT HEMORRHAGE: ICD-10-CM

## 2021-09-13 PROCEDURE — 99213 OFFICE O/P EST LOW 20 MIN: CPT | Performed by: NURSE PRACTITIONER

## 2021-09-13 NOTE — ASSESSMENT & PLAN NOTE
Patient's (Body mass index is 30.31 kg/m².) indicates that they are obese (BMI >30) with health conditions that include hypertension . Weight is improving with treatment. BMI is is above average; BMI management plan is completed. We discussed portion control and increasing exercise.

## 2021-09-13 NOTE — PROGRESS NOTES
"Patient Care Team:  Rahul Griggs DO as PCP - General (Family Medicine)    Reason for Visit:  Medical Weight loss    Subjective   Pilar Mesa is a 51 y.o. female.     Pilar is here for follow-up and continued medical management of her morbid obesity.  She is currently on a prescription diet.  Pilar previously was to apply dietary changes such as following the meal plan as directed.  She admits to eating 4 meals every day.  As a result she lost weight since her last visit.  She states she has fully eliminated soda intake and is walking more.       Review Of Systems:  Review of Systems   Constitutional: Negative.    Respiratory: Negative.    Cardiovascular: Negative.    Gastrointestinal: Negative.    Endocrine: Negative.    Musculoskeletal: Negative.    Psychiatric/Behavioral: Negative.          The following portions of the patient's history were reviewed and updated as appropriate: allergies, current medications, past family history, past medical history, past social history, past surgical history, and problem list.    Objective   /84 (BP Location: Right arm, Patient Position: Sitting, Cuff Size: Adult)   Pulse 76   Temp 98.9 °F (37.2 °C)   Ht 168.3 cm (66.25\")   Wt 85.8 kg (189 lb 3.2 oz)   SpO2 97%   BMI 30.31 kg/m²       09/13/21  0834   Weight: 85.8 kg (189 lb 3.2 oz)       Physical Exam  Vitals reviewed.   Constitutional:       Appearance: She is obese.   Cardiovascular:      Rate and Rhythm: Normal rate and regular rhythm.   Pulmonary:      Effort: Pulmonary effort is normal.   Musculoskeletal:         General: Normal range of motion.   Skin:     General: Skin is warm and dry.   Neurological:      Mental Status: She is alert and oriented to person, place, and time.   Psychiatric:         Mood and Affect: Mood normal.         Behavior: Behavior normal.         Patient's Body mass index is 30.31 kg/m². indicating that she is obese (BMI >30). Obesity-related health conditions include " the following: hypertension and GERD. Obesity is improving with treatment. BMI is is above average; BMI management plan is completed. We discussed portion control and increasing exercise..     Assessment/Plan   Diagnoses and all orders for this visit:    1. Class 1 obesity due to excess calories with serious comorbidity and body mass index (BMI) of 33.0 to 33.9 in adult (Primary)  Assessment & Plan:  Patient's (Body mass index is 30.31 kg/m².) indicates that they are obese (BMI >30) with health conditions that include hypertension . Weight is improving with treatment. BMI is is above average; BMI management plan is completed. We discussed portion control and increasing exercise.       2. Gastroesophageal reflux disease with esophagitis without hemorrhage    3. High cholesterol  Comments:  Nutrition counseling provided        Pilar Mesa was seen today for follow-up, obesity, nutrition counseling and weight loss.  She has lost weight since her last visit.  Today we discussed healthy changes in lifestyle, diet, and exercise. Dietician consultation obtained.  Pilar Mesa had received handouts to her explaining the recommendation on portion sizes/appetite control/reading nutrition labels.   Intensive behavioral therapy for obesity was done today as well.     Goals for this month are:   1. Continue to intake 4 meals each day   2. Continue excessive routine  3. Increase water intake to minimum of 64 ounces of water       Follow up in one month for a weight recheck.A total of 20 minutes was spent face to face with this patient and over half of the time was spent on counseling and coordination of care for the disease of obesity. We specifically reviewed the dietary prescription and I made recommendations toward increasing exercise as tolerated as well as focusing on training their behavior toward storing less.

## 2021-10-05 ENCOUNTER — LAB (OUTPATIENT)
Dept: ONCOLOGY | Facility: CLINIC | Age: 51
End: 2021-10-05

## 2021-10-05 DIAGNOSIS — E61.1 IRON DEFICIENCY: Primary | ICD-10-CM

## 2021-10-05 LAB
ALBUMIN SERPL-MCNC: 4.4 G/DL (ref 3.5–5.2)
ALBUMIN/GLOB SERPL: 1.6 G/DL
ALP SERPL-CCNC: 73 U/L (ref 39–117)
ALT SERPL W P-5'-P-CCNC: 28 U/L (ref 1–33)
ANION GAP SERPL CALCULATED.3IONS-SCNC: 10 MMOL/L (ref 5–15)
AST SERPL-CCNC: 19 U/L (ref 1–32)
BASOPHILS # BLD AUTO: 0.02 10*3/MM3 (ref 0–0.2)
BASOPHILS NFR BLD AUTO: 0.5 % (ref 0–1.5)
BILIRUB SERPL-MCNC: 0.3 MG/DL (ref 0–1.2)
BUN SERPL-MCNC: 15 MG/DL (ref 6–20)
BUN/CREAT SERPL: 21.7 (ref 7–25)
CALCIUM SPEC-SCNC: 9.3 MG/DL (ref 8.6–10.5)
CHLORIDE SERPL-SCNC: 103 MMOL/L (ref 98–107)
CO2 SERPL-SCNC: 24 MMOL/L (ref 22–29)
CREAT SERPL-MCNC: 0.69 MG/DL (ref 0.57–1)
DEPRECATED RDW RBC AUTO: 54.6 FL (ref 37–54)
EOSINOPHIL # BLD AUTO: 0.23 10*3/MM3 (ref 0–0.4)
EOSINOPHIL NFR BLD AUTO: 5.7 % (ref 0.3–6.2)
ERYTHROCYTE [DISTWIDTH] IN BLOOD BY AUTOMATED COUNT: 17.6 % (ref 12.3–15.4)
FERRITIN SERPL-MCNC: 14.01 NG/ML (ref 13–150)
GFR SERPL CREATININE-BSD FRML MDRD: 90 ML/MIN/1.73
GLOBULIN UR ELPH-MCNC: 2.7 GM/DL
GLUCOSE SERPL-MCNC: 152 MG/DL (ref 65–99)
HCT VFR BLD AUTO: 36.9 % (ref 34–46.6)
HGB BLD-MCNC: 11.3 G/DL (ref 12–15.9)
IMM GRANULOCYTES # BLD AUTO: 0.01 10*3/MM3 (ref 0–0.05)
IMM GRANULOCYTES NFR BLD AUTO: 0.2 % (ref 0–0.5)
IRON 24H UR-MRATE: 49 MCG/DL (ref 37–145)
IRON SATN MFR SERPL: 11 % (ref 20–50)
LYMPHOCYTES # BLD AUTO: 1.01 10*3/MM3 (ref 0.7–3.1)
LYMPHOCYTES NFR BLD AUTO: 24.9 % (ref 19.6–45.3)
MCH RBC QN AUTO: 25.2 PG (ref 26.6–33)
MCHC RBC AUTO-ENTMCNC: 30.6 G/DL (ref 31.5–35.7)
MCV RBC AUTO: 82.4 FL (ref 79–97)
MONOCYTES # BLD AUTO: 0.18 10*3/MM3 (ref 0.1–0.9)
MONOCYTES NFR BLD AUTO: 4.4 % (ref 5–12)
NEUTROPHILS NFR BLD AUTO: 2.61 10*3/MM3 (ref 1.7–7)
NEUTROPHILS NFR BLD AUTO: 64.3 % (ref 42.7–76)
PLATELET # BLD AUTO: 261 10*3/MM3 (ref 140–450)
PMV BLD AUTO: 10.4 FL (ref 6–12)
POTASSIUM SERPL-SCNC: 3.5 MMOL/L (ref 3.5–5.2)
PROT SERPL-MCNC: 7.1 G/DL (ref 6–8.5)
RBC # BLD AUTO: 4.48 10*6/MM3 (ref 3.77–5.28)
SODIUM SERPL-SCNC: 137 MMOL/L (ref 136–145)
TIBC SERPL-MCNC: 451 MCG/DL (ref 298–536)
TRANSFERRIN SERPL-MCNC: 303 MG/DL (ref 200–360)
WBC # BLD AUTO: 4.06 10*3/MM3 (ref 3.4–10.8)

## 2021-10-05 PROCEDURE — 85025 COMPLETE CBC W/AUTO DIFF WBC: CPT | Performed by: NURSE PRACTITIONER

## 2021-10-05 PROCEDURE — 83540 ASSAY OF IRON: CPT | Performed by: NURSE PRACTITIONER

## 2021-10-05 PROCEDURE — 82728 ASSAY OF FERRITIN: CPT | Performed by: NURSE PRACTITIONER

## 2021-10-05 PROCEDURE — 80053 COMPREHEN METABOLIC PANEL: CPT | Performed by: NURSE PRACTITIONER

## 2021-10-05 PROCEDURE — 84466 ASSAY OF TRANSFERRIN: CPT | Performed by: NURSE PRACTITIONER

## 2021-10-27 DIAGNOSIS — D50.9 IRON DEFICIENCY ANEMIA, UNSPECIFIED IRON DEFICIENCY ANEMIA TYPE: Primary | ICD-10-CM

## 2021-11-04 ENCOUNTER — LAB (OUTPATIENT)
Dept: ONCOLOGY | Facility: CLINIC | Age: 51
End: 2021-11-04

## 2021-11-04 ENCOUNTER — OFFICE VISIT (OUTPATIENT)
Dept: ONCOLOGY | Facility: CLINIC | Age: 51
End: 2021-11-04

## 2021-11-04 VITALS
RESPIRATION RATE: 16 BRPM | OXYGEN SATURATION: 99 % | BODY MASS INDEX: 26.52 KG/M2 | HEART RATE: 72 BPM | HEIGHT: 68 IN | SYSTOLIC BLOOD PRESSURE: 118 MMHG | WEIGHT: 175 LBS | DIASTOLIC BLOOD PRESSURE: 72 MMHG | TEMPERATURE: 98 F

## 2021-11-04 DIAGNOSIS — D50.0 IRON DEFICIENCY ANEMIA DUE TO CHRONIC BLOOD LOSS: ICD-10-CM

## 2021-11-04 DIAGNOSIS — D50.9 IRON DEFICIENCY ANEMIA, UNSPECIFIED IRON DEFICIENCY ANEMIA TYPE: ICD-10-CM

## 2021-11-04 DIAGNOSIS — E66.09 CLASS 1 OBESITY DUE TO EXCESS CALORIES WITH SERIOUS COMORBIDITY AND BODY MASS INDEX (BMI) OF 33.0 TO 33.9 IN ADULT: ICD-10-CM

## 2021-11-04 DIAGNOSIS — E61.1 IRON DEFICIENCY: Primary | ICD-10-CM

## 2021-11-04 LAB
ALBUMIN SERPL-MCNC: 4.8 G/DL (ref 3.5–5.2)
ALBUMIN/GLOB SERPL: 1.8 G/DL
ALP SERPL-CCNC: 86 U/L (ref 39–117)
ALT SERPL W P-5'-P-CCNC: 25 U/L (ref 1–33)
ANION GAP SERPL CALCULATED.3IONS-SCNC: 11 MMOL/L (ref 5–15)
AST SERPL-CCNC: 19 U/L (ref 1–32)
BASOPHILS # BLD AUTO: 0.03 10*3/MM3 (ref 0–0.2)
BASOPHILS NFR BLD AUTO: 0.7 % (ref 0–1.5)
BILIRUB SERPL-MCNC: 0.3 MG/DL (ref 0–1.2)
BUN SERPL-MCNC: 17 MG/DL (ref 6–20)
BUN/CREAT SERPL: 27.4 (ref 7–25)
CALCIUM SPEC-SCNC: 10.5 MG/DL (ref 8.6–10.5)
CHLORIDE SERPL-SCNC: 103 MMOL/L (ref 98–107)
CO2 SERPL-SCNC: 28 MMOL/L (ref 22–29)
CREAT SERPL-MCNC: 0.62 MG/DL (ref 0.57–1)
DEPRECATED RDW RBC AUTO: 50.7 FL (ref 37–54)
EOSINOPHIL # BLD AUTO: 0.21 10*3/MM3 (ref 0–0.4)
EOSINOPHIL NFR BLD AUTO: 4.7 % (ref 0.3–6.2)
ERYTHROCYTE [DISTWIDTH] IN BLOOD BY AUTOMATED COUNT: 16.3 % (ref 12.3–15.4)
FERRITIN SERPL-MCNC: 12.36 NG/ML (ref 13–150)
GFR SERPL CREATININE-BSD FRML MDRD: 101 ML/MIN/1.73
GLOBULIN UR ELPH-MCNC: 2.7 GM/DL
GLUCOSE SERPL-MCNC: 68 MG/DL (ref 65–99)
HCT VFR BLD AUTO: 39.6 % (ref 34–46.6)
HGB BLD-MCNC: 12.1 G/DL (ref 12–15.9)
IMM GRANULOCYTES # BLD AUTO: 0.01 10*3/MM3 (ref 0–0.05)
IMM GRANULOCYTES NFR BLD AUTO: 0.2 % (ref 0–0.5)
IRON 24H UR-MRATE: 53 MCG/DL (ref 37–145)
IRON SATN MFR SERPL: 10 % (ref 20–50)
LYMPHOCYTES # BLD AUTO: 1 10*3/MM3 (ref 0.7–3.1)
LYMPHOCYTES NFR BLD AUTO: 22.5 % (ref 19.6–45.3)
MCH RBC QN AUTO: 25.4 PG (ref 26.6–33)
MCHC RBC AUTO-ENTMCNC: 30.6 G/DL (ref 31.5–35.7)
MCV RBC AUTO: 83 FL (ref 79–97)
MONOCYTES # BLD AUTO: 0.36 10*3/MM3 (ref 0.1–0.9)
MONOCYTES NFR BLD AUTO: 8.1 % (ref 5–12)
NEUTROPHILS NFR BLD AUTO: 2.83 10*3/MM3 (ref 1.7–7)
NEUTROPHILS NFR BLD AUTO: 63.8 % (ref 42.7–76)
PLATELET # BLD AUTO: 264 10*3/MM3 (ref 140–450)
PMV BLD AUTO: 10.3 FL (ref 6–12)
POTASSIUM SERPL-SCNC: 3.8 MMOL/L (ref 3.5–5.2)
PROT SERPL-MCNC: 7.5 G/DL (ref 6–8.5)
RBC # BLD AUTO: 4.77 10*6/MM3 (ref 3.77–5.28)
SODIUM SERPL-SCNC: 142 MMOL/L (ref 136–145)
TIBC SERPL-MCNC: 532 MCG/DL (ref 298–536)
TRANSFERRIN SERPL-MCNC: 357 MG/DL (ref 200–360)
WBC # BLD AUTO: 4.44 10*3/MM3 (ref 3.4–10.8)

## 2021-11-04 PROCEDURE — 82728 ASSAY OF FERRITIN: CPT | Performed by: NURSE PRACTITIONER

## 2021-11-04 PROCEDURE — 84466 ASSAY OF TRANSFERRIN: CPT | Performed by: NURSE PRACTITIONER

## 2021-11-04 PROCEDURE — 80053 COMPREHEN METABOLIC PANEL: CPT | Performed by: NURSE PRACTITIONER

## 2021-11-04 PROCEDURE — 85025 COMPLETE CBC W/AUTO DIFF WBC: CPT | Performed by: NURSE PRACTITIONER

## 2021-11-04 PROCEDURE — 99213 OFFICE O/P EST LOW 20 MIN: CPT | Performed by: NURSE PRACTITIONER

## 2021-11-04 PROCEDURE — 83540 ASSAY OF IRON: CPT | Performed by: NURSE PRACTITIONER

## 2021-11-04 NOTE — PROGRESS NOTES
[facility]  HEMATOLOGY & ONCOLOGY    [facility] ON[facility] HEMATOLOGY AND ONCOLOGY  [address] [place] [address] [address]  [facility] [address]  [phone]    Patient Name: Pilar Mesa  Encounter Date: 11/04/2021  YOB: 1970  Patient Number: [id number]    Chief Complaint   Patient presents with   • Anemia     Here for f/u       REASON FOR VISIT: Mrs. Mesa is a 51-year-old female patient who was referred to our office back in  June 2021 by her primary care provider Dr. Griggs for anemia.  She was initially seen by nam Perez and who is no longer with our practice.      A work-up was completed by Dr Tamez in June 2021 and found on the day of initial consultation her hemoglobin was 10.5, hematocrit 36.7, MCV 77.3.  Platelet count 332,000 and white count of 5.98.  Her CMP was normal except for nonfasting glucose of 117.  Iron profile found saturation of 5% and ferritin was only 13.07. Sed rate normal at 19.  Her vitamin B12 level was 978 with a normal folate of 16.7.      Dr. Tamez had performed a peripheral blood flow cytometry that was negative as well as a bone marrow biopsy that was also s within normal range.  The bone marrow biopsy on 7/7/2021 found a normocellular bone marrow estimated at 50% cellularity.  Myeloid to erythroid ratio appears to be within normal limits in the marrow.  Maturing myelopoiesis without maturation arrest was noted.  No obvious an increase in plasma cells or lymphocytes in the marrow.  There was adequate megakaryocytes.  No stainable iron was noted.  There is no evidence of metastatic carcinoma or granulomatosis disease.  Flow cytometry was normal as well as cytogenetics.    Dr. Tamez gave her 1 dose of Venofer 200 mg IV on 7/13/2021.  Repeat hemoglobin following administration was down to 9.5 with hematocrit of 31.5 on 7/19/2021.  Dr. Tamez also placed the patient on dexamethasone 1 mg twice a day.  Patient states she has cut  it back to 1 a day as she was not tolerating them twice a day.  The reason for prescribing dexamethasone is not clear.    She followed back up August 5th and again received more iron on August 24 and 31st at [facility] for a hgb of 10.4.     She states today that she does feel fatigued and tired.  Does not have much energy.  She has no obvious blood loss except for her normal menstruation for which she states has been quite heavy lately.  She states she has had 4 periods since she was here last.  Aug 30th to Sept 4th. Sept 28th to Oct 2nd. October 11th to 16th and then again October 20-22.  She was just seen by her GYN recently with a normal pap. She had a colonoscopy about 2 years ago by Dr. Gilmore which she states was normal.  She has had no change in her bowel habits or color.  She has had no fever chills or night sweats.  No abdominal pain nausea or vomiting.  No shortness of breath or cough.    PAST MEDICAL HISTORY:  ALLERGIES:  Allergies   Allergen Reactions   • Neosporin Plus Max St Rash       CURRENT MEDICATIONS:  Outpatient Encounter Medications as of 11/4/2021   Medication Sig Dispense Refill   • busPIRone (BUSPAR) 10 MG tablet Take 10 mg by mouth 3 (Three) Times a Day.     • hydroCHLOROthiazide (HYDRODIURIL) 25 MG tablet Take 25 mg by mouth Daily.     • lisinopril (PRINIVIL,ZESTRIL) 40 MG tablet Take 40 mg by mouth Daily.     • pantoprazole (PROTONIX) 20 MG EC tablet Take 20 mg by mouth Daily.     • sertraline (ZOLOFT) 25 MG tablet Take 25 mg by mouth Daily.     • [DISCONTINUED] simvastatin (Zocor) 20 MG tablet Take 20 mg by mouth Every Night.     • [DISCONTINUED] temazepam (RESTORIL) 15 MG capsule Take 15 mg by mouth At Night As Needed for Sleep.       No facility-administered encounter medications on file as of 11/4/2021.       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   • Class 1 obesity due to excess calories with serious comorbidity and body mass index (BMI) of 33.0 to 33.9 in adult E66.09, Z68.33   • GERD (gastroesophageal reflux disease) K21.9   • Malignant melanoma of overlapping sites (HCC) C43.8   • High cholesterol E78.00   • Iron deficiency anemia, unspecified D50.9   • Malabsorption syndrome K90.9       SURGERIES:  Past Surgical History:   Procedure Laterality Date   • APPENDECTOMY     • CHOLECYSTECTOMY  2017   • LYMPH NODE BIOPSY     • POSTPARTUM TUBAL LIGATION     • SINUS SURGERY     • SKIN CANCER EXCISION         HEALTH MAINTENANCE ITEMS:    Last Completed Colonoscopy     2 years ago per patient [place] [facility] and normal           Last Completed Mammogram     Patient states mammogram was completed at [facility] this month and normal          FAMILY HISTORY:  Family History   Problem Relation Age of Onset   • Heart disease Father    • Lung cancer Father        SOCIAL HISTORY:  Social History     Socioeconomic History   • Marital status:    Tobacco Use   • Smoking status: Never Smoker   • Smokeless tobacco: Never Used       REVIEW OF SYSTEMS:  Review of Systems   Constitutional: Positive for fatigue. Negative for activity change, appetite change (Wears out easily), chills, diaphoresis, fever and unexpected weight loss.   HENT: Negative for nosebleeds, sinus pressure, sore throat and voice change.    Eyes: Negative for blurred vision, double vision and visual disturbance.   Respiratory: Negative for cough and shortness of breath.    Cardiovascular: Negative for chest pain, palpitations and leg swelling.   Gastrointestinal: Negative for abdominal pain, anal bleeding, blood in stool, constipation, diarrhea, nausea and vomiting.   Genitourinary: Positive for menstrual problem. Negative for dysuria, frequency, hematuria, urgency and urinary incontinence.   Musculoskeletal: Negative for arthralgias and myalgias.   Skin: Negative for  "rash and skin lesions.   Neurological: Negative for dizziness, weakness and headache.   Hematological: Negative for adenopathy. Does not bruise/bleed easily.   Psychiatric/Behavioral: Negative for sleep disturbance and depressed mood.       /72   Pulse 72   Temp 98 °F (36.7 °C) (Temporal)   Resp 16   Ht 172.7 cm (68\")   Wt 79.4 kg (175 lb)   SpO2 99%   Breastfeeding No   BMI 26.61 kg/m²  Body surface area is 1.93 meters squared.  Pain Score    11/04/21 0837   PainSc: 0-No pain       Physical Exam:  Physical Exam  Vitals reviewed.   Constitutional:       Appearance: Normal appearance. She is well-developed.   HENT:      Head: Atraumatic.   Eyes:      General: No scleral icterus.     Pupils: Pupils are equal, round, and reactive to light.   Neck:      Trachea: Trachea normal.   Cardiovascular:      Rate and Rhythm: Normal rate and regular rhythm.      Pulses: Normal pulses.      Heart sounds: No murmur heard.      Pulmonary:      Effort: Pulmonary effort is normal.      Breath sounds: Normal breath sounds. No wheezing, rhonchi or rales.   Abdominal:      Palpations: Abdomen is soft.      Tenderness: There is no abdominal tenderness. There is no guarding or rebound.   Musculoskeletal:      Cervical back: Neck supple.   Skin:     General: Skin is warm and dry.   Neurological:      General: No focal deficit present.      Mental Status: She is alert and oriented to person, place, and time.      Sensory: No sensory deficit.   Psychiatric:         Mood and Affect: Mood normal.         Behavior: Behavior normal.         Pilar KELLER Mesa reports a pain score of 0.    Patient's Body mass index is 26.61 kg/m². indicating that she is obese (BMI >30). We discussed deferring to pcp..      LABS    Lab Results - Last 18 Months   Lab Units 11/04/21  0802 10/05/21  0806 08/05/21  0959 07/19/21  1248 07/07/21  0938 06/15/21  0910   HEMOGLOBIN g/dL 12.1 11.3* 10.4* 9.5* 10.6* 10.5*   HEMATOCRIT % 39.6 36.9 34.6 31.5* 34.5 " 36.7   MCV fL 83.0 82.4 79.5 78.6* 75.3* 77.3*   WBC 10*3/mm3 4.44 4.06 9.26 6.47 4.92 5.98   RDW % 16.3* 17.6* 18.4* 20.8* 20.4* 22.2*   MPV fL 10.3 10.4 9.4 10.4 10.9 10.5   PLATELETS 10*3/mm3 264 261 283 267 252 332   IMM GRAN % % 0.2 0.2 0.1 0.3 0.0 0.2   NEUTROS ABS 10*3/mm3 2.83 2.61 7.77* 4.62 2.99 3.92   LYMPHS ABS 10*3/mm3 1.00 1.01 0.86 1.22 1.25 1.32   MONOS ABS 10*3/mm3 0.36 0.18 0.51 0.38 0.39 0.42   EOS ABS 10*3/mm3 0.21 0.23 0.07 0.18 0.24 0.26   BASOS ABS 10*3/mm3 0.03 0.02 0.04 0.05 0.05 0.05   IMMATURE GRANS (ABS) 10*3/mm3 0.01 0.01 0.01 0.02 0.00 0.01   NRBC /100 WBC  --   --   --  0.0 0.0 0.0       Lab Results - Last 18 Months   Lab Units 10/05/21  0806 08/05/21  0959 07/19/21  1248 06/15/21  0910   GLUCOSE mg/dL 152* 112* 102* 117*   SODIUM mmol/L 137 135* 136 136   POTASSIUM mmol/L 3.5 3.7 3.7 4.0   CO2 mmol/L 24.0 23.0 23.0 26.0   CHLORIDE mmol/L 103 101 101 101   ANION GAP mmol/L 10.0 11.0 12.0 9.0   CREATININE mg/dL 0.69 0.60 0.64 0.60   BUN mg/dL 15 15 14 13   BUN / CREAT RATIO  21.7 25.0 21.9 21.7   CALCIUM mg/dL 9.3 9.7 9.7 10.0   EGFR IF NONAFRICN AM mL/min/1.73 90 105 98 105   ALK PHOS U/L 73 71 72 103   TOTAL PROTEIN g/dL 7.1 7.2 7.0 7.5   ALT (SGPT) U/L 28 22 20 17   AST (SGOT) U/L 19 15 20 17   BILIRUBIN mg/dL 0.3 0.2 0.2 0.3   ALBUMIN g/dL 4.40 4.40 4.20 4.10   GLOBULIN gm/dL 2.7 2.8 2.8 3.4       Lab Results - Last 18 Months   Lab Units 06/16/21  0701   REFERENCE LAB REPORT  See Attached Result        Lab Results - Last 18 Months   Lab Units 10/05/21  0806 06/15/21  0910   IRON mcg/dL 49 29*   TIBC mcg/dL 451 539*   IRON SATURATION % 11* 5*   FERRITIN ng/mL 14.01 13.07   FOLATE ng/mL  --  16.70     ASSESSMENT  1.  Iron deficiency anemia with baseline hemoglobin of 10.5 and MCV of 77.3.  She has a baseline ferritin of 13 and iron saturation of 5% as of 6/15/2021.  Up to this point she has been treated with 3 doses of Venofer 200 mg with the last being on August 31.  She is  intolerant to oral iron therapy.  --Her hemoglobin today is normal at 12.1.  Iron studies are currently pending however she has had significant menstrual loss since August 30 as mentioned above.  I suspect her iron studies will still be low.  She is still fatigued and tired.    2.  Hypertension controlled with lisinopril and hydrochlorothiazide.  Blood pressure is currently 118/72  3.  GERD symptoms controlled with Protonix  4.  Depression and anxiety on Zoloft /BuSpar  5.  Patient with history of a melanoma to the abdominal wall diagnosed in September 2010.  Breslow thickness 0.63 mm, Julio César's level 4.  No histologic evidence of residual in situ or invasive malignant melanoma identified in the surgical margins.  Benign intramammary lymph node demonstrating no evidence of disease.  Patient follows with dermatology.  6.  Abnormal uterine bleeding.  Patient had a recent normal Pap in visit with GYN however I have advised her to contact her GYN with the amount of menstrual loss she is having.  She verbalized understanding.    PLAN  1.  Reviewed lab results with patient today that were available.  2.  Advised the patient that she may need additional iron infusions depending on her ferritin and iron saturation with the extent of menstrual loss she has had to hopefully prevent hgb drop.   3.  Encourage patient to follow with GYN for her abnormal uterine bleeding   4.  Advised patient to continue to follow with primary care provider and other specialists  5.  Patient return for follow-up in 12 weeks at that time she will need CBC, CMP, iron profile and ferritin    Nithya Mayen, APRN  11/04/2021

## 2021-12-01 RX ORDER — SODIUM CHLORIDE 9 MG/ML
250 INJECTION, SOLUTION INTRAVENOUS ONCE
Status: CANCELLED | OUTPATIENT
Start: 2021-12-06

## 2021-12-01 RX ORDER — ACETAMINOPHEN 325 MG/1
650 TABLET ORAL ONCE
Status: CANCELLED | OUTPATIENT
Start: 2021-12-08

## 2021-12-01 RX ORDER — ACETAMINOPHEN 325 MG/1
650 TABLET ORAL ONCE
Status: CANCELLED | OUTPATIENT
Start: 2021-12-10

## 2021-12-01 RX ORDER — SODIUM CHLORIDE 9 MG/ML
250 INJECTION, SOLUTION INTRAVENOUS ONCE
Status: CANCELLED | OUTPATIENT
Start: 2021-12-10

## 2021-12-01 RX ORDER — ACETAMINOPHEN 325 MG/1
650 TABLET ORAL ONCE
Status: CANCELLED | OUTPATIENT
Start: 2021-12-06

## 2021-12-01 RX ORDER — DIPHENHYDRAMINE HYDROCHLORIDE 50 MG/ML
50 INJECTION INTRAMUSCULAR; INTRAVENOUS AS NEEDED
Status: CANCELLED | OUTPATIENT
Start: 2021-12-06

## 2021-12-01 RX ORDER — SODIUM CHLORIDE 9 MG/ML
250 INJECTION, SOLUTION INTRAVENOUS ONCE
Status: CANCELLED | OUTPATIENT
Start: 2021-12-08

## 2021-12-01 RX ORDER — DIPHENHYDRAMINE HYDROCHLORIDE 50 MG/ML
50 INJECTION INTRAMUSCULAR; INTRAVENOUS AS NEEDED
Status: CANCELLED | OUTPATIENT
Start: 2021-12-10

## 2021-12-01 RX ORDER — FAMOTIDINE 10 MG/ML
20 INJECTION, SOLUTION INTRAVENOUS AS NEEDED
Status: CANCELLED | OUTPATIENT
Start: 2021-12-10

## 2021-12-01 RX ORDER — FAMOTIDINE 10 MG/ML
20 INJECTION, SOLUTION INTRAVENOUS AS NEEDED
Status: CANCELLED | OUTPATIENT
Start: 2021-12-08

## 2021-12-01 RX ORDER — DIPHENHYDRAMINE HYDROCHLORIDE 50 MG/ML
50 INJECTION INTRAMUSCULAR; INTRAVENOUS AS NEEDED
Status: CANCELLED | OUTPATIENT
Start: 2021-12-08

## 2021-12-01 RX ORDER — FAMOTIDINE 10 MG/ML
20 INJECTION, SOLUTION INTRAVENOUS AS NEEDED
Status: CANCELLED | OUTPATIENT
Start: 2021-12-06

## 2021-12-02 ENCOUNTER — TELEPHONE (OUTPATIENT)
Dept: ONCOLOGY | Facility: CLINIC | Age: 51
End: 2021-12-02

## 2021-12-02 NOTE — TELEPHONE ENCOUNTER
Per infusion sched the only day pt can be moved to morning is 12/10/21. Spoke w/pt and let her know. She v/u

## 2021-12-02 NOTE — TELEPHONE ENCOUNTER
Caller: Pilar Mesa    Relationship to patient: Self    Best call back number: [phone] PLEASE LEAVE DETAILED MESSAGE IF NO ANSWER    Chief complaint: PT TO CHANGE 12/8 AND 12/10  INFUSION APPTS TO A MORNING TIME     Type of visit: INFUSION    Requested date: SAME DAY MORNING

## 2021-12-06 ENCOUNTER — INFUSION (OUTPATIENT)
Dept: ONCOLOGY | Facility: HOSPITAL | Age: 51
End: 2021-12-06

## 2021-12-06 VITALS
OXYGEN SATURATION: 100 % | BODY MASS INDEX: 27.37 KG/M2 | RESPIRATION RATE: 16 BRPM | DIASTOLIC BLOOD PRESSURE: 67 MMHG | SYSTOLIC BLOOD PRESSURE: 113 MMHG | HEART RATE: 78 BPM | WEIGHT: 180.6 LBS | TEMPERATURE: 96.8 F | HEIGHT: 68 IN

## 2021-12-06 DIAGNOSIS — K90.9 MALABSORPTION SYNDROME: ICD-10-CM

## 2021-12-06 DIAGNOSIS — D50.9 IRON DEFICIENCY ANEMIA, UNSPECIFIED IRON DEFICIENCY ANEMIA TYPE: Primary | ICD-10-CM

## 2021-12-06 PROCEDURE — 96367 TX/PROPH/DG ADDL SEQ IV INF: CPT

## 2021-12-06 PROCEDURE — 25010000002 DIPHENHYDRAMINE PER 50 MG: Performed by: INTERNAL MEDICINE

## 2021-12-06 PROCEDURE — 25010000002 IRON SUCROSE PER 1 MG: Performed by: INTERNAL MEDICINE

## 2021-12-06 PROCEDURE — 96365 THER/PROPH/DIAG IV INF INIT: CPT

## 2021-12-06 RX ORDER — FAMOTIDINE 10 MG/ML
20 INJECTION, SOLUTION INTRAVENOUS AS NEEDED
Status: DISCONTINUED | OUTPATIENT
Start: 2021-12-06 | End: 2021-12-06 | Stop reason: HOSPADM

## 2021-12-06 RX ORDER — ACETAMINOPHEN 325 MG/1
650 TABLET ORAL ONCE
Status: COMPLETED | OUTPATIENT
Start: 2021-12-06 | End: 2021-12-06

## 2021-12-06 RX ORDER — DIPHENHYDRAMINE HYDROCHLORIDE 50 MG/ML
50 INJECTION INTRAMUSCULAR; INTRAVENOUS AS NEEDED
Status: DISCONTINUED | OUTPATIENT
Start: 2021-12-06 | End: 2021-12-06 | Stop reason: HOSPADM

## 2021-12-06 RX ORDER — SODIUM CHLORIDE 9 MG/ML
250 INJECTION, SOLUTION INTRAVENOUS ONCE
Status: COMPLETED | OUTPATIENT
Start: 2021-12-06 | End: 2021-12-06

## 2021-12-06 RX ADMIN — IRON SUCROSE 200 MG: 20 INJECTION, SOLUTION INTRAVENOUS at 15:16

## 2021-12-06 RX ADMIN — DIPHENHYDRAMINE HYDROCHLORIDE 25 MG: 50 INJECTION INTRAMUSCULAR; INTRAVENOUS at 14:55

## 2021-12-06 RX ADMIN — SODIUM CHLORIDE 250 ML: 9 INJECTION, SOLUTION INTRAVENOUS at 14:55

## 2021-12-06 RX ADMIN — ACETAMINOPHEN 650 MG: 325 TABLET, FILM COATED ORAL at 14:56

## 2021-12-08 ENCOUNTER — INFUSION (OUTPATIENT)
Dept: ONCOLOGY | Facility: HOSPITAL | Age: 51
End: 2021-12-08

## 2021-12-08 VITALS
DIASTOLIC BLOOD PRESSURE: 66 MMHG | OXYGEN SATURATION: 100 % | BODY MASS INDEX: 27.43 KG/M2 | SYSTOLIC BLOOD PRESSURE: 117 MMHG | WEIGHT: 181 LBS | HEIGHT: 68 IN | RESPIRATION RATE: 16 BRPM | TEMPERATURE: 96.8 F | HEART RATE: 75 BPM

## 2021-12-08 DIAGNOSIS — K90.9 MALABSORPTION SYNDROME: ICD-10-CM

## 2021-12-08 DIAGNOSIS — D50.9 IRON DEFICIENCY ANEMIA, UNSPECIFIED IRON DEFICIENCY ANEMIA TYPE: Primary | ICD-10-CM

## 2021-12-08 PROCEDURE — 25010000002 DIPHENHYDRAMINE PER 50 MG: Performed by: INTERNAL MEDICINE

## 2021-12-08 PROCEDURE — 96367 TX/PROPH/DG ADDL SEQ IV INF: CPT

## 2021-12-08 PROCEDURE — 96365 THER/PROPH/DIAG IV INF INIT: CPT

## 2021-12-08 PROCEDURE — 25010000002 IRON SUCROSE PER 1 MG: Performed by: INTERNAL MEDICINE

## 2021-12-08 RX ORDER — FAMOTIDINE 10 MG/ML
20 INJECTION, SOLUTION INTRAVENOUS AS NEEDED
Status: DISCONTINUED | OUTPATIENT
Start: 2021-12-08 | End: 2021-12-08 | Stop reason: HOSPADM

## 2021-12-08 RX ORDER — DIPHENHYDRAMINE HYDROCHLORIDE 50 MG/ML
50 INJECTION INTRAMUSCULAR; INTRAVENOUS AS NEEDED
Status: DISCONTINUED | OUTPATIENT
Start: 2021-12-08 | End: 2021-12-08 | Stop reason: HOSPADM

## 2021-12-08 RX ORDER — SODIUM CHLORIDE 9 MG/ML
250 INJECTION, SOLUTION INTRAVENOUS ONCE
Status: COMPLETED | OUTPATIENT
Start: 2021-12-08 | End: 2021-12-08

## 2021-12-08 RX ORDER — ACETAMINOPHEN 325 MG/1
650 TABLET ORAL ONCE
Status: COMPLETED | OUTPATIENT
Start: 2021-12-08 | End: 2021-12-08

## 2021-12-08 RX ADMIN — IRON SUCROSE 200 MG: 20 INJECTION, SOLUTION INTRAVENOUS at 15:17

## 2021-12-08 RX ADMIN — ACETAMINOPHEN 650 MG: 325 TABLET, FILM COATED ORAL at 14:58

## 2021-12-08 RX ADMIN — SODIUM CHLORIDE 250 ML: 9 INJECTION, SOLUTION INTRAVENOUS at 14:58

## 2021-12-08 RX ADMIN — DIPHENHYDRAMINE HYDROCHLORIDE 25 MG: 50 INJECTION, SOLUTION INTRAMUSCULAR; INTRAVENOUS at 14:59

## 2021-12-10 ENCOUNTER — INFUSION (OUTPATIENT)
Dept: ONCOLOGY | Facility: HOSPITAL | Age: 51
End: 2021-12-10

## 2021-12-10 VITALS
TEMPERATURE: 97.1 F | WEIGHT: 182 LBS | DIASTOLIC BLOOD PRESSURE: 64 MMHG | SYSTOLIC BLOOD PRESSURE: 110 MMHG | OXYGEN SATURATION: 99 % | BODY MASS INDEX: 27.58 KG/M2 | RESPIRATION RATE: 16 BRPM | HEART RATE: 69 BPM | HEIGHT: 68 IN

## 2021-12-10 DIAGNOSIS — K90.9 MALABSORPTION SYNDROME: ICD-10-CM

## 2021-12-10 DIAGNOSIS — D50.9 IRON DEFICIENCY ANEMIA, UNSPECIFIED IRON DEFICIENCY ANEMIA TYPE: Primary | ICD-10-CM

## 2021-12-10 PROCEDURE — 96365 THER/PROPH/DIAG IV INF INIT: CPT

## 2021-12-10 PROCEDURE — 96367 TX/PROPH/DG ADDL SEQ IV INF: CPT

## 2021-12-10 PROCEDURE — 25010000002 IRON SUCROSE PER 1 MG: Performed by: INTERNAL MEDICINE

## 2021-12-10 PROCEDURE — 25010000002 DIPHENHYDRAMINE PER 50 MG: Performed by: INTERNAL MEDICINE

## 2021-12-10 RX ORDER — SODIUM CHLORIDE 9 MG/ML
250 INJECTION, SOLUTION INTRAVENOUS ONCE
Status: COMPLETED | OUTPATIENT
Start: 2021-12-10 | End: 2021-12-10

## 2021-12-10 RX ORDER — DIPHENHYDRAMINE HYDROCHLORIDE 50 MG/ML
50 INJECTION INTRAMUSCULAR; INTRAVENOUS AS NEEDED
Status: DISCONTINUED | OUTPATIENT
Start: 2021-12-10 | End: 2021-12-10 | Stop reason: HOSPADM

## 2021-12-10 RX ORDER — FAMOTIDINE 10 MG/ML
20 INJECTION, SOLUTION INTRAVENOUS AS NEEDED
Status: DISCONTINUED | OUTPATIENT
Start: 2021-12-10 | End: 2021-12-10 | Stop reason: HOSPADM

## 2021-12-10 RX ORDER — ACETAMINOPHEN 325 MG/1
650 TABLET ORAL ONCE
Status: COMPLETED | OUTPATIENT
Start: 2021-12-10 | End: 2021-12-10

## 2021-12-10 RX ADMIN — SODIUM CHLORIDE 250 ML: 9 INJECTION, SOLUTION INTRAVENOUS at 09:19

## 2021-12-10 RX ADMIN — DIPHENHYDRAMINE HYDROCHLORIDE 25 MG: 50 INJECTION, SOLUTION INTRAMUSCULAR; INTRAVENOUS at 09:20

## 2021-12-10 RX ADMIN — ACETAMINOPHEN 650 MG: 325 TABLET, FILM COATED ORAL at 09:18

## 2021-12-10 RX ADMIN — IRON SUCROSE 200 MG: 20 INJECTION, SOLUTION INTRAVENOUS at 09:41

## 2021-12-13 ENCOUNTER — OFFICE VISIT (OUTPATIENT)
Dept: BARIATRICS/WEIGHT MGMT | Facility: CLINIC | Age: 51
End: 2021-12-13

## 2021-12-13 VITALS
DIASTOLIC BLOOD PRESSURE: 83 MMHG | OXYGEN SATURATION: 99 % | TEMPERATURE: 97.3 F | WEIGHT: 176.2 LBS | BODY MASS INDEX: 28.32 KG/M2 | HEIGHT: 66 IN | SYSTOLIC BLOOD PRESSURE: 119 MMHG | HEART RATE: 90 BPM

## 2021-12-13 DIAGNOSIS — K21.00 GASTROESOPHAGEAL REFLUX DISEASE WITH ESOPHAGITIS WITHOUT HEMORRHAGE: ICD-10-CM

## 2021-12-13 DIAGNOSIS — E78.00 HIGH CHOLESTEROL: ICD-10-CM

## 2021-12-13 DIAGNOSIS — E66.3 OVERWEIGHT WITH BODY MASS INDEX (BMI) OF 28 TO 28.9 IN ADULT: Primary | ICD-10-CM

## 2021-12-13 PROCEDURE — 99213 OFFICE O/P EST LOW 20 MIN: CPT | Performed by: NURSE PRACTITIONER

## 2021-12-13 NOTE — PROGRESS NOTES
"Patient Care Team:  Rahul Griggs DO as PCP - General (Family Medicine)    Reason for Visit: Medical weight loss    Subjective   Pilar Mesa is a 51 y.o. female.     Pilar is here for follow-up and continued medical management of her morbid obesity.  She is currently on a prescription diet.  Pilar previously was to apply dietary changes such as following the meal plan as directed.  She admits to eating 4 meals per day.  As a result she lost weight since her last visit.  Patient has lost 13 pounds since her last appointment with us.  Patient has had personal stressors over the past month and states that she has been working on overcoming emotional eating.  Patient states that she is continue to make progress.  Patient denies soda intake and denies nicotine use.    Review Of Systems:  Review of Systems   Constitutional: Negative.    Respiratory: Negative.    Cardiovascular: Negative.    Gastrointestinal: Negative.    Endocrine: Negative.    Musculoskeletal: Negative.    Psychiatric/Behavioral: The patient is nervous/anxious.        The following portions of the patient's history were reviewed and updated as appropriate: allergies, current medications, past family history, past medical history, past social history, past surgical history, and problem list.    Objective   /83 (BP Location: Right arm, Patient Position: Sitting, Cuff Size: Adult)   Pulse 90   Temp 97.3 °F (36.3 °C)   Ht 168.3 cm (66.25\")   Wt 79.9 kg (176 lb 3.2 oz)   SpO2 99%   BMI 28.23 kg/m²       12/13/21  0818   Weight: 79.9 kg (176 lb 3.2 oz)       Physical Exam  Vitals reviewed.   Constitutional:       Appearance: She is obese.   Cardiovascular:      Rate and Rhythm: Normal rate and regular rhythm.   Pulmonary:      Effort: Pulmonary effort is normal.   Musculoskeletal:         General: Normal range of motion.   Skin:     General: Skin is warm and dry.   Neurological:      Mental Status: She is alert and oriented to " person, place, and time.   Psychiatric:         Mood and Affect: Mood normal.         Behavior: Behavior normal.         Patient's Body mass index is 28.23 kg/m². indicating that she is overweight (BMI 25-29.9). Obesity-related health conditions include the following: GERD. Obesity is improving with treatment. BMI is is above average; BMI management plan is completed. We discussed portion control and increasing exercise..     Assessment/Plan   Diagnoses and all orders for this visit:    1. Overweight with body mass index (BMI) of 28 to 28.9 in adult (Primary)    2. Gastroesophageal reflux disease with esophagitis without hemorrhage  Comments:  Continue current regimen.    3. High cholesterol  Comments:  Nutritional counseling provided.         Pilar Mesa was seen today for follow-up, obesity, nutrition counseling and weight loss.  She has lost weight since her last visit.  Today we discussed healthy changes in lifestyle, diet, and exercise. Dietician consultation obtained.  Pilar Mesa had received handouts to her explaining the recommendation on portion sizes/appetite control/reading nutrition labels.   Intensive behavioral therapy for obesity was done today as well.   Goals for this month are:   1.  Continue following prescription meal plan.  Overall patient is doing well.  We discussed some healthy coping mechanisms to assist with stress eating.    Follow up in one month for a weight recheck.A total of 20 minutes was spent face to face with this patient and over half of the time was spent on counseling and coordination of care for the disease of obesity. We specifically reviewed the dietary prescription and I made recommendations toward increasing exercise as tolerated as well as focusing on training their behavior toward storing less.

## 2022-01-18 NOTE — PROGRESS NOTES
[facility] GROUP HEMATOLOGY & ONCOLOGY  [address]  [place] [address] [phone]    Patient Name: Pilar Mesa  Encounter Date: 01/25/2022  YOB: 1970  Patient Number: [id number]      REASON FOR FOLLOW-UP: Pilar Mesa is a pleasant 51 y.o.  female who is seen on follow-up for microcytic anemia from iron deficiency from menorrhagia.  She had her fourth dose of Venofer completed 1.5 months ago.  She is intolerant to oral iron (diarrhea and stomach ache). She is seen alone.  She is a reliable historian.        DIAGNOSTIC ABNORMALITIES:  WBC 9, hemoglobin 8.7, hematocrit 30.5, MCV 71 and platelet 394 on 07/29/2020.  WBC 5.9, hemoglobin 10.5, hematocrit 36.7, MCV 77.3 and platelet 332 on 06/15/2021.  Her CMP was normal except for nonfasting glucose of 117.  Reticulocyte count 2.14. Iron profile found saturation of 5% and ferritin of 13.07.  Her B12 level was 978 with a normal folate of 16.7.    Negative flow cytometry 06/18/2021.  Bone marrow biopsy 07/13/2021.Bone marrow, left posterior iliac crest, aspirate smears, aspirate clot and core biopsy:  A. Normocellular bone marrow (estimated at 50% cellularity).  B. Myeloid to erythroid ratio appears to be within normal limits in the marrow.  C. Maturing myelopoiesis without maturation arrest.  D. No obvious an increase in plasma cells or lymphocytes in the marrow.  E. Adequate megakaryocytes in the marrow.  F. No stainable iron in the marrow.  G. No evidence of metastatic carcinoma or granulomatous disease in the marrow.  Negative flow cytometry and cytogenetics 46XX.  Negative MDS panel.      PREVIOUS INTERVENTIONS:  Oral steroid by Dr. Tamez. ?  Indication for 1 month.  Venofer 200 mg 07/13/2021, 12/06/2021, 12/08/2021 and 12/10/2021.        Problem List Items Addressed This Visit     None        Oncology/Hematology History    No history exists.       PAST MEDICAL  HISTORY:  ALLERGIES:  Allergies   Allergen Reactions   • Neosporin Plus Max St Rash     CURRENT MEDICATIONS:  Outpatient Encounter Medications as of 1/25/2022   Medication Sig Dispense Refill   • busPIRone (BUSPAR) 10 MG tablet Take 10 mg by mouth 3 (Three) Times a Day.     • hydroCHLOROthiazide (HYDRODIURIL) 25 MG tablet Take 25 mg by mouth Daily.     • lisinopril (PRINIVIL,ZESTRIL) 40 MG tablet Take 40 mg by mouth Daily.     • pantoprazole (PROTONIX) 20 MG EC tablet Take 20 mg by mouth Daily.     • sertraline (ZOLOFT) 25 MG tablet Take 25 mg by mouth Daily.       No facility-administered encounter medications on file as of 1/25/2022.     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   • Class 1 obesity due to excess calories with serious comorbidity and body mass index (BMI) of 33.0 to 33.9 in adult E66.09, Z68.33   • GERD (gastroesophageal reflux disease) K21.9   • Malignant melanoma of overlapping sites (HCC) C43.8   • High cholesterol E78.00   • Iron deficiency anemia, unspecified D50.9   • Malabsorption syndrome K90.9   • Overweight with body mass index (BMI) of 28 to 28.9 in adult E66.3, Z68.28     SURGERIES:  Past Surgical History:   Procedure Laterality Date   • APPENDECTOMY     • CHOLECYSTECTOMY  2017   • LYMPH NODE BIOPSY     • POSTPARTUM TUBAL LIGATION     • SINUS SURGERY     • SKIN CANCER EXCISION       HEALTH MAINTENANCE ITEMS:  Health Maintenance Due   Topic Date Due   • MAMMOGRAM  Never done   • COLORECTAL CANCER SCREENING  Never done   • ANNUAL PHYSICAL  Never done   • COVID-19 Vaccine (1) Never done   • TDAP/TD VACCINES (1 - Tdap) Never done   • ZOSTER VACCINE (1 of 2) Never done   • HEPATITIS C SCREENING  Never done   • PAP SMEAR  Never done   • LIPID PANEL  Never done   • INFLUENZA VACCINE  Never done       <no information>  Last Completed  "Colonoscopy     This patient has no relevant Health Maintenance data.          There is no immunization history on file for this patient.  Last Completed Mammogram     This patient has no relevant Health Maintenance data.            FAMILY HISTORY:  Family History   Problem Relation Age of Onset   • Heart disease Father    • Lung cancer Father      SOCIAL HISTORY:  Social History     Socioeconomic History   • Marital status:    Tobacco Use   • Smoking status: Never Smoker   • Smokeless tobacco: Never Used       REVIEW OF SYSTEMS:    Review of Systems   Constitutional: Positive for fatigue. Negative for chills and fever.        \"I feel good. Not like I was.\"   HENT: Negative for congestion, mouth sores and trouble swallowing.    Eyes: Negative for redness and visual disturbance.   Respiratory: Negative for cough, shortness of breath and wheezing.    Cardiovascular: Negative for chest pain and palpitations.   Gastrointestinal: Negative for abdominal pain, blood in stool, nausea and indigestion.   Endocrine: Negative for polydipsia and polyphagia.   Genitourinary: Negative for difficulty urinating, hematuria and vaginal bleeding.   Musculoskeletal: Negative for gait problem and neck stiffness.   Skin: Negative for pallor.   Allergic/Immunologic: Negative for food allergies.   Neurological: Negative for speech difficulty, weakness and confusion.   Hematological: Negative for adenopathy. Does not bruise/bleed easily.   Psychiatric/Behavioral: Negative for agitation and depressed mood. The patient is not nervous/anxious.          VITAL SIGNS: /76   Pulse 87   Temp 97.6 °F (36.4 °C)   Resp 18   Ht 168.3 cm (66.25\")   Wt 82.2 kg (181 lb 3.2 oz)   SpO2 98%   Breastfeeding No   BMI 29.03 kg/m²  Body surface area is 1.92 meters squared.   Pain Score    01/25/22 1457   PainSc: 0-No pain           PHYSICAL EXAMINATION:     Physical Exam  Vitals reviewed.   Constitutional:       General: She is not in acute " distress.  HENT:      Head: Normocephalic and atraumatic.   Eyes:      General: No scleral icterus.  Cardiovascular:      Rate and Rhythm: Normal rate.   Pulmonary:      Effort: No respiratory distress.      Breath sounds: No wheezing or rales.   Abdominal:      General: Bowel sounds are normal.      Palpations: Abdomen is soft.      Tenderness: There is no abdominal tenderness.   Musculoskeletal:         General: No swelling.      Cervical back: Neck supple.   Skin:     General: Skin is warm and dry.      Coloration: Skin is not pale.   Neurological:      Mental Status: She is alert and oriented to person, place, and time.   Psychiatric:         Mood and Affect: Mood normal.         Behavior: Behavior normal.         Thought Content: Thought content normal.         Judgment: Judgment normal.         LABS    Lab Results - Last 18 Months   Lab Units 11/04/21  0802 10/05/21  0806 08/05/21  0959 07/19/21  1248 07/07/21  0938 06/15/21  0910   HEMOGLOBIN g/dL 12.1 11.3* 10.4* 9.5* 10.6* 10.5*   HEMATOCRIT % 39.6 36.9 34.6 31.5* 34.5 36.7   MCV fL 83.0 82.4 79.5 78.6* 75.3* 77.3*   WBC 10*3/mm3 4.44 4.06 9.26 6.47 4.92 5.98   RDW % 16.3* 17.6* 18.4* 20.8* 20.4* 22.2*   MPV fL 10.3 10.4 9.4 10.4 10.9 10.5   PLATELETS 10*3/mm3 264 261 283 267 252 332   IMM GRAN % % 0.2 0.2 0.1 0.3 0.0 0.2   NEUTROS ABS 10*3/mm3 2.83 2.61 7.77* 4.62 2.99 3.92   LYMPHS ABS 10*3/mm3 1.00 1.01 0.86 1.22 1.25 1.32   MONOS ABS 10*3/mm3 0.36 0.18 0.51 0.38 0.39 0.42   EOS ABS 10*3/mm3 0.21 0.23 0.07 0.18 0.24 0.26   BASOS ABS 10*3/mm3 0.03 0.02 0.04 0.05 0.05 0.05   IMMATURE GRANS (ABS) 10*3/mm3 0.01 0.01 0.01 0.02 0.00 0.01   NRBC /100 WBC  --   --   --  0.0 0.0 0.0       Lab Results - Last 18 Months   Lab Units 11/04/21  0802 10/05/21  0806 08/05/21  0959 07/19/21  1248 06/15/21  0910   GLUCOSE mg/dL 68 152* 112* 102* 117*   SODIUM mmol/L 142 137 135* 136 136   POTASSIUM mmol/L 3.8 3.5 3.7 3.7 4.0   CO2 mmol/L 28.0 24.0 23.0 23.0 26.0   CHLORIDE  "mmol/L 103 103 101 101 101   ANION GAP mmol/L 11.0 10.0 11.0 12.0 9.0   CREATININE mg/dL 0.62 0.69 0.60 0.64 0.60   BUN mg/dL 17 15 15 14 13   BUN / CREAT RATIO  27.4* 21.7 25.0 21.9 21.7   CALCIUM mg/dL 10.5 9.3 9.7 9.7 10.0   EGFR IF NONAFRICN AM mL/min/1.73 101 90 105 98 105   ALK PHOS U/L 86 73 71 72 103   TOTAL PROTEIN g/dL 7.5 7.1 7.2 7.0 7.5   ALT (SGPT) U/L 25 28 22 20 17   AST (SGOT) U/L 19 19 15 20 17   BILIRUBIN mg/dL 0.3 0.3 0.2 0.2 0.3   ALBUMIN g/dL 4.80 4.40 4.40 4.20 4.10   GLOBULIN gm/dL 2.7 2.7 2.8 2.8 3.4       Lab Results - Last 18 Months   Lab Units 06/16/21  0701   REFERENCE LAB REPORT  See Attached Result        Lab Results - Last 18 Months   Lab Units 11/04/21  0802 10/05/21  0806 06/15/21  0910   IRON mcg/dL 53 49 29*   TIBC mcg/dL 532 451 539*   IRON SATURATION % 10* 11* 5*   FERRITIN ng/mL 12.36* 14.01 13.07   FOLATE ng/mL  --   --  16.70         Pilar KELLER Mesa reports a pain score of 0.        ASSESSMENT:  1.  Anemia, microcytic, secondary to iron deficiency.  Colonoscopy by Dr. Mando Quezada was negative per patient.  Treatment status: Venofer 200 mg 07/13/2021, 12/06/2021, 12/08/2021 and 12/10/2021.  Intolerant to oral iron.  2. Iron deficiency from menorrhagia.  3.  Menorrhagia followed by Dr. Bernal.  4.  Melanoma, abdominal wall 09/2010.  Followed by Dr. Layla Olea.  Breslow thickness 0.63 mm, Julio César's level 4.  Treatment status: Post excision and sentinel node biopsy by Dr. Mayer. No adjuvant therapy required.         PLAN:  1.   Re: The reason for follow-up.  2.   Re: Tolerance to Venofer. \"It was alright.\"  3.   Re: Heme status. None.  4.   Re: CMP. None.   5.   Re: Ferritin and iron profile. None.  6. Blood for CBC with differential, ferritin and iron profile today.   7. CBC with differential, ferritin and iron profile every 4 weeks.  8.  Continue care per primary care physician and other specialist.  9.  Plan of care discussed with patient.  " "Understanding expressed.  Patient agreeable to proceed.  10. Order Venofer 200 mg IV daily for 2 doses after review of iron profile, ferritin and CBC today.  . Intolerant to oral iron, diarrhea and stomach ache. \"Terrible.\" Observe for infusion reactions or anaphylaxis.   11.  Return to office in 3 months with preoffice CMP.        I have reviewed the assessment and plan and verified the accuracy of it. No changes to assessment and plan since the information was documented. Aníbal Lizarraga MD 01/25/22       I spent 31 total minutes, face-to-face, caring for Pilar today.  Greater than 50% of this time involved counseling and/or coordination of care as documented within this note regarding the patient's illness(es), pros and cons of various treatment options, instructions and/or risk reduction.      MD Singh White DO  (Suzanne Bernal MD)  (Layla Olea MD)  "

## 2022-01-25 ENCOUNTER — OFFICE VISIT (OUTPATIENT)
Dept: ONCOLOGY | Facility: CLINIC | Age: 52
End: 2022-01-25

## 2022-01-25 ENCOUNTER — LAB (OUTPATIENT)
Dept: LAB | Facility: HOSPITAL | Age: 52
End: 2022-01-25

## 2022-01-25 ENCOUNTER — TELEPHONE (OUTPATIENT)
Dept: ONCOLOGY | Facility: CLINIC | Age: 52
End: 2022-01-25

## 2022-01-25 VITALS
WEIGHT: 181.2 LBS | TEMPERATURE: 97.6 F | OXYGEN SATURATION: 98 % | RESPIRATION RATE: 18 BRPM | BODY MASS INDEX: 29.12 KG/M2 | HEART RATE: 87 BPM | DIASTOLIC BLOOD PRESSURE: 76 MMHG | SYSTOLIC BLOOD PRESSURE: 122 MMHG | HEIGHT: 66 IN

## 2022-01-25 DIAGNOSIS — D50.0 IRON DEFICIENCY ANEMIA DUE TO CHRONIC BLOOD LOSS: Primary | ICD-10-CM

## 2022-01-25 LAB
BASOPHILS # BLD AUTO: 0.04 10*3/MM3 (ref 0–0.2)
BASOPHILS NFR BLD AUTO: 0.6 % (ref 0–1.5)
DEPRECATED RDW RBC AUTO: 58 FL (ref 37–54)
EOSINOPHIL # BLD AUTO: 0.2 10*3/MM3 (ref 0–0.4)
EOSINOPHIL NFR BLD AUTO: 3 % (ref 0.3–6.2)
ERYTHROCYTE [DISTWIDTH] IN BLOOD BY AUTOMATED COUNT: 17.9 % (ref 12.3–15.4)
FERRITIN SERPL-MCNC: 36.95 NG/ML (ref 13–150)
HCT VFR BLD AUTO: 40.7 % (ref 34–46.6)
HGB BLD-MCNC: 13.1 G/DL (ref 12–15.9)
HOLD SPECIMEN: NORMAL
IMM GRANULOCYTES # BLD AUTO: 0.02 10*3/MM3 (ref 0–0.05)
IMM GRANULOCYTES NFR BLD AUTO: 0.3 % (ref 0–0.5)
IRON 24H UR-MRATE: 53 MCG/DL (ref 37–145)
IRON SATN MFR SERPL: 11 % (ref 20–50)
LYMPHOCYTES # BLD AUTO: 1.48 10*3/MM3 (ref 0.7–3.1)
LYMPHOCYTES NFR BLD AUTO: 22.2 % (ref 19.6–45.3)
MCH RBC QN AUTO: 28.4 PG (ref 26.6–33)
MCHC RBC AUTO-ENTMCNC: 32.2 G/DL (ref 31.5–35.7)
MCV RBC AUTO: 88.3 FL (ref 79–97)
MONOCYTES # BLD AUTO: 0.45 10*3/MM3 (ref 0.1–0.9)
MONOCYTES NFR BLD AUTO: 6.8 % (ref 5–12)
NEUTROPHILS NFR BLD AUTO: 4.47 10*3/MM3 (ref 1.7–7)
NEUTROPHILS NFR BLD AUTO: 67.1 % (ref 42.7–76)
NRBC BLD AUTO-RTO: 0 /100 WBC (ref 0–0.2)
PLATELET # BLD AUTO: 253 10*3/MM3 (ref 140–450)
PMV BLD AUTO: 11.4 FL (ref 6–12)
RBC # BLD AUTO: 4.61 10*6/MM3 (ref 3.77–5.28)
TIBC SERPL-MCNC: 472 MCG/DL (ref 298–536)
TRANSFERRIN SERPL-MCNC: 317 MG/DL (ref 200–360)
WBC NRBC COR # BLD: 6.66 10*3/MM3 (ref 3.4–10.8)

## 2022-01-25 PROCEDURE — 36415 COLL VENOUS BLD VENIPUNCTURE: CPT

## 2022-01-25 PROCEDURE — 83540 ASSAY OF IRON: CPT

## 2022-01-25 PROCEDURE — 84466 ASSAY OF TRANSFERRIN: CPT

## 2022-01-25 PROCEDURE — 82728 ASSAY OF FERRITIN: CPT

## 2022-01-25 PROCEDURE — 85025 COMPLETE CBC W/AUTO DIFF WBC: CPT

## 2022-01-25 PROCEDURE — 99214 OFFICE O/P EST MOD 30 MIN: CPT | Performed by: INTERNAL MEDICINE

## 2022-01-25 NOTE — TELEPHONE ENCOUNTER
----- Message from Aníbal Lizarraga MD sent at 1/25/2022  4:12 PM CST -----  Notify patient.  Disregard Venofer order, saturation 11% but normal hemoglobin.  Stable for observation.

## 2022-03-14 ENCOUNTER — OFFICE VISIT (OUTPATIENT)
Dept: BARIATRICS/WEIGHT MGMT | Facility: CLINIC | Age: 52
End: 2022-03-14

## 2022-03-14 VITALS
BODY MASS INDEX: 28.25 KG/M2 | WEIGHT: 175.8 LBS | SYSTOLIC BLOOD PRESSURE: 122 MMHG | OXYGEN SATURATION: 98 % | HEIGHT: 66 IN | DIASTOLIC BLOOD PRESSURE: 83 MMHG | TEMPERATURE: 98.4 F | HEART RATE: 98 BPM

## 2022-03-14 DIAGNOSIS — E78.00 HIGH CHOLESTEROL: ICD-10-CM

## 2022-03-14 DIAGNOSIS — E66.3 OVERWEIGHT WITH BODY MASS INDEX (BMI) OF 28 TO 28.9 IN ADULT: Primary | ICD-10-CM

## 2022-03-14 DIAGNOSIS — I10 ESSENTIAL HYPERTENSION: ICD-10-CM

## 2022-03-14 DIAGNOSIS — K21.00 GASTROESOPHAGEAL REFLUX DISEASE WITH ESOPHAGITIS WITHOUT HEMORRHAGE: ICD-10-CM

## 2022-03-14 PROCEDURE — 99213 OFFICE O/P EST LOW 20 MIN: CPT | Performed by: NURSE PRACTITIONER

## 2022-03-14 NOTE — PROGRESS NOTES
"Patient Care Team:  Rahul Griggs DO as PCP - General (Family Medicine)    Reason for Visit:  Medical Weight loss    Subjective   Pilar Mesa is a 52 y.o. female.     Pilar is here for follow-up and continued medical management of her morbid obesity.  She is currently on a prescription diet.  Pilar previously was to apply dietary changes such as following the meal plan as directed.  She admits to eating 4 meals per day.  As a result she lost weight since her last visit.    Review Of Systems:  Review of Systems   Constitutional: Negative.    Respiratory: Negative.    Cardiovascular: Negative.    Gastrointestinal: Negative.    Endocrine: Negative.    Musculoskeletal: Negative.    Psychiatric/Behavioral: The patient is nervous/anxious.        The following portions of the patient's history were reviewed and updated as appropriate: allergies, current medications, past family history, past medical history, past social history, past surgical history, and problem list.    Objective   /83 (BP Location: Right arm, Patient Position: Sitting, Cuff Size: Adult)   Pulse 98   Temp 98.4 °F (36.9 °C)   Ht 168.3 cm (66.25\")   Wt 79.7 kg (175 lb 12.8 oz)   SpO2 98%   BMI 28.16 kg/m²       03/14/22  0825   Weight: 79.7 kg (175 lb 12.8 oz)       Physical Exam  Vitals reviewed.   Constitutional:       Appearance: She is obese.   Cardiovascular:      Rate and Rhythm: Normal rate and regular rhythm.   Pulmonary:      Effort: Pulmonary effort is normal.   Skin:     General: Skin is warm and dry.   Neurological:      Mental Status: She is alert and oriented to person, place, and time.   Psychiatric:         Mood and Affect: Mood normal.         Behavior: Behavior normal.         Patient's Body mass index is 28.16 kg/m². indicating that she is overweight (BMI 25-29.9). Patient's (Body mass index is 28.16 kg/m².) indicates that they are overweight with health conditions that include hypertension and GERD . Weight " is improving with treatment. BMI is is above average; BMI management plan is completed. We discussed portion control and increasing exercise. .     Assessment/Plan   Diagnoses and all orders for this visit:    1. Overweight with body mass index (BMI) of 28 to 28.9 in adult (Primary)    2. Gastroesophageal reflux disease with esophagitis without hemorrhage  Comments:  Continue Protonix.  Nutritional counseling provided.    3. Essential hypertension  Comments:  Encouraged to follow-up with PCP for medication adjustments as needed.  Patient's goal is to begin decreasing blood pressure medication.    4. High cholesterol  Comments:  Nutritional counseling provided.         Pilar Msea was seen today for follow-up, obesity, nutrition counseling and weight loss.  She has not lost any significant weight since her last visit.  Today we discussed healthy changes in lifestyle, diet, and exercise. Dietician consultation obtained.  Pilar Mesa had received handouts to her explaining the recommendation on portion sizes/appetite control/reading nutrition labels.   Intensive behavioral therapy for obesity was done today as well.     Goals for this month are:   1.  Continue following prescription meal plan.  Patient states that she has some personal stressors that caused her not to follow the prescription meal plan but that has improved and she is back to following it.  2.  Contact information for [facility] given to patient.  Patient is currently going through divorce and states that she is seeing her PCP for medication management.  3.  Patient advised to keep a food journal and bring 1 week/month into her next appointment.  Patient has been encouraged to increase exercise.  She is currently walking for at least 45 minutes once a day.  I encouraged her to add brisk walking for increased cardio.  She verbalizes understanding.    Follow up in one month for a weight recheck.A total of 20 minutes was spent face to  face with this patient and over half of the time was spent on counseling and coordination of care for the disease of obesity. We specifically reviewed the dietary prescription and I made recommendations toward increasing exercise as tolerated as well as focusing on training their behavior toward storing less.

## 2022-04-15 NOTE — PROGRESS NOTES
[facility] HEMATOLOGY & ONCOLOGY  [address]  [facility] [address] [phone]    Patient Name: Pilar Mesa  Encounter Date: 04/26/2022  YOB: 1970  Patient Number: [id number]      REASON FOR FOLLOW-UP: Pilar Mesa is a pleasant 52 y.o.  female who is seen on follow-up for microcytic anemia from iron deficiency from menorrhagia.  She had her latest Venofer completed 4.5 months ago.  She is intolerant to oral iron (diarrhea and stomach ache).  She is seen alone.  She is a reliable historian.         DIAGNOSTIC ABNORMALITIES:  WBC 9, hemoglobin 8.7, hematocrit 30.5, MCV 71 and platelet 394 on 07/29/2020.  WBC 5.9, hemoglobin 10.5, hematocrit 36.7, MCV 77.3 and platelet 332 on 06/15/2021.  Her CMP was normal except for nonfasting glucose of 117.  Reticulocyte count 2.14. Iron profile found saturation of 5% and ferritin of 13.07.  Her B12 level was 978 with a normal folate of 16.7.    Negative flow cytometry 06/18/2021.  Bone marrow biopsy 07/13/2021.Bone marrow, left posterior iliac crest, aspirate smears, aspirate clot and core biopsy:  A. Normocellular bone marrow (estimated at 50% cellularity).  B. Myeloid to erythroid ratio appears to be within normal limits in the marrow.  C. Maturing myelopoiesis without maturation arrest.  D. No obvious an increase in plasma cells or lymphocytes in the marrow.  E. Adequate megakaryocytes in the marrow.  F. No stainable iron in the marrow.  G. No evidence of metastatic carcinoma or granulomatous disease in the marrow.  Negative flow cytometry and cytogenetics 46XX.  Negative MDS panel.        PREVIOUS INTERVENTIONS:  Oral steroid by Dr. Tamez. ?  Indication for 1 month.  Venofer 200 mg 07/13/2021, 12/06/2021, 12/08/2021 and 12/10/2021.        Problem List Items Addressed This Visit    None       Oncology/Hematology History    No history exists.       PAST MEDICAL  HISTORY:  ALLERGIES:  Allergies   Allergen Reactions   • Neosporin Plus Max St Rash     CURRENT MEDICATIONS:  Outpatient Encounter Medications as of 4/26/2022   Medication Sig Dispense Refill   • busPIRone (BUSPAR) 10 MG tablet Take 10 mg by mouth 3 (Three) Times a Day.     • hydroCHLOROthiazide (HYDRODIURIL) 25 MG tablet Take 25 mg by mouth Daily.     • lisinopril (PRINIVIL,ZESTRIL) 40 MG tablet Take 40 mg by mouth Daily.     • pantoprazole (PROTONIX) 20 MG EC tablet Take 20 mg by mouth Daily.     • sertraline (ZOLOFT) 25 MG tablet Take 25 mg by mouth Daily.       No facility-administered encounter medications on file as of 4/26/2022.     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   • Class 1 obesity due to excess calories with serious comorbidity and body mass index (BMI) of 33.0 to 33.9 in adult E66.09, Z68.33   • GERD (gastroesophageal reflux disease) K21.9   • Malignant melanoma of overlapping sites (HCC) C43.8   • High cholesterol E78.00   • Iron deficiency anemia, unspecified D50.9   • Malabsorption syndrome K90.9   • Overweight with body mass index (BMI) of 28 to 28.9 in adult E66.3, Z68.28   • Essential hypertension I10     SURGERIES:  Past Surgical History:   Procedure Laterality Date   • APPENDECTOMY     • CHOLECYSTECTOMY  2017   • LYMPH NODE BIOPSY     • POSTPARTUM TUBAL LIGATION     • SINUS SURGERY     • SKIN CANCER EXCISION       HEALTH MAINTENANCE ITEMS:  Health Maintenance Due   Topic Date Due   • MAMMOGRAM  Never done   • COLORECTAL CANCER SCREENING  Never done   • ANNUAL PHYSICAL  Never done   • COVID-19 Vaccine (1) Never done   • TDAP/TD VACCINES (1 - Tdap) Never done   • ZOSTER VACCINE (1 of 2) Never done   • HEPATITIS C SCREENING  Never done   • PAP SMEAR  Never done   • LIPID PANEL  Never done       <no information>  Last Completed  "Colonoscopy     This patient has no relevant Health Maintenance data.          There is no immunization history on file for this patient.  Last Completed Mammogram     This patient has no relevant Health Maintenance data.            FAMILY HISTORY:  Family History   Problem Relation Age of Onset   • Heart disease Father    • Lung cancer Father      SOCIAL HISTORY:  Social History     Socioeconomic History   • Marital status:    Tobacco Use   • Smoking status: Never Smoker   • Smokeless tobacco: Never Used       REVIEW OF SYSTEMS:    Review of Systems   Constitutional: Negative for chills, fatigue and fever.        \"I feel good.\"   HENT: Negative for congestion, hearing loss and trouble swallowing.    Eyes: Negative for blurred vision and redness.   Respiratory: Negative for cough, shortness of breath and wheezing.    Cardiovascular: Negative for chest pain and leg swelling.   Gastrointestinal: Negative for blood in stool, nausea and vomiting.   Endocrine: Negative for polydipsia and polyphagia.   Genitourinary: Positive for menstrual problem. Negative for flank pain and hematuria.   Musculoskeletal: Negative for gait problem and myalgias.   Skin: Negative for pallor.   Allergic/Immunologic: Negative for food allergies.   Neurological: Negative for speech difficulty, weakness and confusion.   Hematological: Negative for adenopathy. Does not bruise/bleed easily.   Psychiatric/Behavioral: Negative for agitation and hallucinations. The patient is not nervous/anxious.          VITAL SIGNS: /72   Pulse 77   Temp 97.1 °F (36.2 °C)   Resp 16   Ht 168.3 cm (66.25\")   Wt 79.7 kg (175 lb 12.8 oz)   SpO2 99%   Breastfeeding No   BMI 28.16 kg/m²  Body surface area is 1.9 meters squared.   Pain Score    04/26/22 0759   PainSc: 0-No pain         PHYSICAL EXAMINATION:     Physical Exam  Vitals reviewed.   Constitutional:       General: She is not in acute distress.  HENT:      Head: Normocephalic and " atraumatic.   Eyes:      General: No scleral icterus.  Cardiovascular:      Rate and Rhythm: Normal rate.   Pulmonary:      Effort: No respiratory distress.      Breath sounds: No wheezing or rales.   Abdominal:      General: Bowel sounds are normal.      Palpations: Abdomen is soft.      Tenderness: There is no abdominal tenderness.   Musculoskeletal:         General: No swelling.      Cervical back: Neck supple.   Skin:     General: Skin is warm.      Coloration: Skin is not pale.   Neurological:      Mental Status: She is alert and oriented to person, place, and time.   Psychiatric:         Mood and Affect: Mood normal.         Behavior: Behavior normal.         Thought Content: Thought content normal.         Judgment: Judgment normal.         LABS    Lab Results - Last 18 Months   Lab Units 01/25/22  1529 11/04/21  0802 10/05/21  0806 08/05/21  0959 07/19/21  1248 07/07/21  0938 06/15/21  0910   HEMOGLOBIN g/dL 13.1 12.1 11.3* 10.4* 9.5* 10.6* 10.5*   HEMATOCRIT % 40.7 39.6 36.9 34.6 31.5* 34.5 36.7   MCV fL 88.3 83.0 82.4 79.5 78.6* 75.3* 77.3*   WBC 10*3/mm3 6.66 4.44 4.06 9.26 6.47 4.92 5.98   RDW % 17.9* 16.3* 17.6* 18.4* 20.8* 20.4* 22.2*   MPV fL 11.4 10.3 10.4 9.4 10.4 10.9 10.5   PLATELETS 10*3/mm3 253 264 261 283 267 252 332   IMM GRAN % % 0.3 0.2 0.2 0.1 0.3 0.0 0.2   NEUTROS ABS 10*3/mm3 4.47 2.83 2.61 7.77* 4.62 2.99 3.92   LYMPHS ABS 10*3/mm3 1.48 1.00 1.01 0.86 1.22 1.25 1.32   MONOS ABS 10*3/mm3 0.45 0.36 0.18 0.51 0.38 0.39 0.42   EOS ABS 10*3/mm3 0.20 0.21 0.23 0.07 0.18 0.24 0.26   BASOS ABS 10*3/mm3 0.04 0.03 0.02 0.04 0.05 0.05 0.05   IMMATURE GRANS (ABS) 10*3/mm3 0.02 0.01 0.01 0.01 0.02 0.00 0.01   NRBC /100 WBC 0.0  --   --   --  0.0 0.0 0.0       Lab Results - Last 18 Months   Lab Units 11/04/21  0802 10/05/21  0806 08/05/21  0959 07/19/21  1248 06/15/21  0910   GLUCOSE mg/dL 68 152* 112* 102* 117*   SODIUM mmol/L 142 137 135* 136 136   POTASSIUM mmol/L 3.8 3.5 3.7 3.7 4.0   CO2 mmol/L  28.0 24.0 23.0 23.0 26.0   CHLORIDE mmol/L 103 103 101 101 101   ANION GAP mmol/L 11.0 10.0 11.0 12.0 9.0   CREATININE mg/dL 0.62 0.69 0.60 0.64 0.60   BUN mg/dL 17 15 15 14 13   BUN / CREAT RATIO  27.4* 21.7 25.0 21.9 21.7   CALCIUM mg/dL 10.5 9.3 9.7 9.7 10.0   EGFR IF NONAFRICN AM mL/min/1.73 101 90 105 98 105   ALK PHOS U/L 86 73 71 72 103   TOTAL PROTEIN g/dL 7.5 7.1 7.2 7.0 7.5   ALT (SGPT) U/L 25 28 22 20 17   AST (SGOT) U/L 19 19 15 20 17   BILIRUBIN mg/dL 0.3 0.3 0.2 0.2 0.3   ALBUMIN g/dL 4.80 4.40 4.40 4.20 4.10   GLOBULIN gm/dL 2.7 2.7 2.8 2.8 3.4       Lab Results - Last 18 Months   Lab Units 06/16/21  0701   REFERENCE LAB REPORT  See Attached Result        Lab Results - Last 18 Months   Lab Units 01/25/22  1529 11/04/21  0802 10/05/21  0806 06/15/21  0910   IRON mcg/dL 53 53 49 29*   TIBC mcg/dL 472 532 451 539*   IRON SATURATION % 11* 10* 11* 5*   FERRITIN ng/mL 36.95 12.36* 14.01 13.07   FOLATE ng/mL  --   --   --  16.70         Pilar Mesa reports a pain score of 0.        ASSESSMENT:  1.  Anemia, microcytic, secondary to iron deficiency.  Colonoscopy by Dr. Gilmore 2020 was negative per patient.  Treatment status: Venofer 200 mg 07/13/2021, 12/06/2021, 12/08/2021 and 12/10/2021.  Intolerant to oral iron.  2.  Iron deficiency from menorrhagia.  3.  Menorrhagia followed by Dr. Bernal.  4.  Melanoma, abdominal wall 09/2010.  Followed by Dr. Layla Olea.  Breslow thickness 0.63 mm, Julio César's level 4.  Treatment status: Post excision and sentinel node biopsy by Dr. Mayer. No adjuvant therapy required.            PLAN:  1.   Re: Note from Glendy Conley, GLO 03/14/2022 was reviewed. Discussed healthy life changes.  2.   Re: No IV iron needed since her last visit.  3.   Re: Heme status. Normal hemoglobin on 02/23/2022.  4.   Re: CMP. GFR greater than 60.  Calcium 10.1, normal.  5.   Re: Ferritin and iron profile. None.  6.  Blood for CBC with differential, ferritin  "and iron profile today.   7.  CBC with differential, ferritin and iron profile every 6 weeks.  8.  Continue care per primary care physician and other specialist.  9.  Plan of care discussed with patient.  Understanding expressed.  Patient agreeable to proceed.  10. Order Venofer 200 mg IV daily for 2 doses if anemic. Intolerant to oral iron, diarrhea and stomach ache. \"Stomach cramps and diarrhea.\" Observe for infusion reactions or anaphylaxis.   11.  Return to office in 3 months with CMP, CBC with differential, ferritin and iron profile, same day.       I have reviewed the assessment and plan and verified the accuracy of it. No changes to assessment and plan since the information was documented. Aníbal Lizarraga MD 04/26/22        I spent 32 total minutes, face-to-face, caring for Pilar today.  Greater than 50% of this time involved counseling and/or coordination of care as documented within this note regarding the patient's illness(es), pros and cons of various treatment options, instructions and/or risk reduction.           (Edna Gilmore MD)  Singh Griggs DO  (Suzanne Bernal MD)  (Layla Olea MD)    "

## 2022-04-26 ENCOUNTER — LAB (OUTPATIENT)
Dept: LAB | Facility: HOSPITAL | Age: 52
End: 2022-04-26

## 2022-04-26 ENCOUNTER — TELEPHONE (OUTPATIENT)
Dept: ONCOLOGY | Facility: CLINIC | Age: 52
End: 2022-04-26

## 2022-04-26 ENCOUNTER — OFFICE VISIT (OUTPATIENT)
Dept: ONCOLOGY | Facility: CLINIC | Age: 52
End: 2022-04-26

## 2022-04-26 VITALS
WEIGHT: 175.8 LBS | BODY MASS INDEX: 28.25 KG/M2 | HEART RATE: 77 BPM | SYSTOLIC BLOOD PRESSURE: 128 MMHG | DIASTOLIC BLOOD PRESSURE: 72 MMHG | HEIGHT: 66 IN | RESPIRATION RATE: 16 BRPM | TEMPERATURE: 97.1 F | OXYGEN SATURATION: 99 %

## 2022-04-26 DIAGNOSIS — D50.0 IRON DEFICIENCY ANEMIA DUE TO CHRONIC BLOOD LOSS: Primary | ICD-10-CM

## 2022-04-26 DIAGNOSIS — D50.0 IRON DEFICIENCY ANEMIA DUE TO CHRONIC BLOOD LOSS: ICD-10-CM

## 2022-04-26 LAB
ALBUMIN SERPL-MCNC: 4.7 G/DL (ref 3.5–5.2)
ALBUMIN/GLOB SERPL: 1.8 G/DL
ALP SERPL-CCNC: 83 U/L (ref 39–117)
ALT SERPL W P-5'-P-CCNC: 18 U/L (ref 1–33)
ANION GAP SERPL CALCULATED.3IONS-SCNC: 10 MMOL/L (ref 5–15)
AST SERPL-CCNC: 17 U/L (ref 1–32)
BASOPHILS # BLD AUTO: 0.06 10*3/MM3 (ref 0–0.2)
BASOPHILS NFR BLD AUTO: 0.9 % (ref 0–1.5)
BILIRUB SERPL-MCNC: 0.2 MG/DL (ref 0–1.2)
BUN SERPL-MCNC: 15 MG/DL (ref 6–20)
BUN/CREAT SERPL: 26.8 (ref 7–25)
CALCIUM SPEC-SCNC: 9.9 MG/DL (ref 8.6–10.5)
CHLORIDE SERPL-SCNC: 104 MMOL/L (ref 98–107)
CO2 SERPL-SCNC: 26 MMOL/L (ref 22–29)
CREAT SERPL-MCNC: 0.56 MG/DL (ref 0.57–1)
DEPRECATED RDW RBC AUTO: 40.6 FL (ref 37–54)
EGFRCR SERPLBLD CKD-EPI 2021: 110 ML/MIN/1.73
EOSINOPHIL # BLD AUTO: 0.21 10*3/MM3 (ref 0–0.4)
EOSINOPHIL NFR BLD AUTO: 3.3 % (ref 0.3–6.2)
ERYTHROCYTE [DISTWIDTH] IN BLOOD BY AUTOMATED COUNT: 13.1 % (ref 12.3–15.4)
FERRITIN SERPL-MCNC: 5.78 NG/ML (ref 13–150)
GLOBULIN UR ELPH-MCNC: 2.6 GM/DL
GLUCOSE SERPL-MCNC: 87 MG/DL (ref 65–99)
HCT VFR BLD AUTO: 38.3 % (ref 34–46.6)
HGB BLD-MCNC: 11.8 G/DL (ref 12–15.9)
IMM GRANULOCYTES # BLD AUTO: 0.02 10*3/MM3 (ref 0–0.05)
IMM GRANULOCYTES NFR BLD AUTO: 0.3 % (ref 0–0.5)
IRON 24H UR-MRATE: 24 MCG/DL (ref 37–145)
IRON SATN MFR SERPL: 4 % (ref 20–50)
LYMPHOCYTES # BLD AUTO: 1.28 10*3/MM3 (ref 0.7–3.1)
LYMPHOCYTES NFR BLD AUTO: 20.1 % (ref 19.6–45.3)
MCH RBC QN AUTO: 26.4 PG (ref 26.6–33)
MCHC RBC AUTO-ENTMCNC: 30.8 G/DL (ref 31.5–35.7)
MCV RBC AUTO: 85.7 FL (ref 79–97)
MONOCYTES # BLD AUTO: 0.52 10*3/MM3 (ref 0.1–0.9)
MONOCYTES NFR BLD AUTO: 8.2 % (ref 5–12)
NEUTROPHILS NFR BLD AUTO: 4.29 10*3/MM3 (ref 1.7–7)
NEUTROPHILS NFR BLD AUTO: 67.2 % (ref 42.7–76)
NRBC BLD AUTO-RTO: 0 /100 WBC (ref 0–0.2)
PLATELET # BLD AUTO: 305 10*3/MM3 (ref 140–450)
PMV BLD AUTO: 11 FL (ref 6–12)
POTASSIUM SERPL-SCNC: 3.5 MMOL/L (ref 3.5–5.2)
PROT SERPL-MCNC: 7.3 G/DL (ref 6–8.5)
RBC # BLD AUTO: 4.47 10*6/MM3 (ref 3.77–5.28)
SODIUM SERPL-SCNC: 140 MMOL/L (ref 136–145)
TIBC SERPL-MCNC: 580 MCG/DL (ref 298–536)
TRANSFERRIN SERPL-MCNC: 389 MG/DL (ref 200–360)
WBC NRBC COR # BLD: 6.38 10*3/MM3 (ref 3.4–10.8)

## 2022-04-26 PROCEDURE — 84466 ASSAY OF TRANSFERRIN: CPT

## 2022-04-26 PROCEDURE — 82728 ASSAY OF FERRITIN: CPT

## 2022-04-26 PROCEDURE — 83540 ASSAY OF IRON: CPT

## 2022-04-26 PROCEDURE — 80053 COMPREHEN METABOLIC PANEL: CPT

## 2022-04-26 PROCEDURE — 36415 COLL VENOUS BLD VENIPUNCTURE: CPT

## 2022-04-26 PROCEDURE — 85025 COMPLETE CBC W/AUTO DIFF WBC: CPT

## 2022-04-26 PROCEDURE — 99214 OFFICE O/P EST MOD 30 MIN: CPT | Performed by: INTERNAL MEDICINE

## 2022-04-27 RX ORDER — FAMOTIDINE 10 MG/ML
20 INJECTION, SOLUTION INTRAVENOUS AS NEEDED
Status: CANCELLED | OUTPATIENT
Start: 2022-05-04

## 2022-04-27 RX ORDER — SODIUM CHLORIDE 9 MG/ML
250 INJECTION, SOLUTION INTRAVENOUS ONCE
Status: CANCELLED | OUTPATIENT
Start: 2022-05-18

## 2022-04-27 RX ORDER — SODIUM CHLORIDE 9 MG/ML
250 INJECTION, SOLUTION INTRAVENOUS ONCE
Status: CANCELLED | OUTPATIENT
Start: 2022-05-04

## 2022-04-27 RX ORDER — FAMOTIDINE 10 MG/ML
20 INJECTION, SOLUTION INTRAVENOUS AS NEEDED
Status: CANCELLED | OUTPATIENT
Start: 2022-05-18

## 2022-04-27 RX ORDER — DIPHENHYDRAMINE HYDROCHLORIDE 50 MG/ML
50 INJECTION INTRAMUSCULAR; INTRAVENOUS AS NEEDED
Status: CANCELLED | OUTPATIENT
Start: 2022-05-18

## 2022-04-27 RX ORDER — DIPHENHYDRAMINE HYDROCHLORIDE 50 MG/ML
50 INJECTION INTRAMUSCULAR; INTRAVENOUS AS NEEDED
Status: CANCELLED | OUTPATIENT
Start: 2022-05-04

## 2022-04-27 RX ORDER — ACETAMINOPHEN 325 MG/1
650 TABLET ORAL ONCE
Status: CANCELLED | OUTPATIENT
Start: 2022-05-18

## 2022-04-27 RX ORDER — ACETAMINOPHEN 325 MG/1
650 TABLET ORAL ONCE
Status: CANCELLED | OUTPATIENT
Start: 2022-05-04

## 2022-04-28 ENCOUNTER — TELEPHONE (OUTPATIENT)
Dept: ONCOLOGY | Facility: CLINIC | Age: 52
End: 2022-04-28

## 2022-04-28 NOTE — TELEPHONE ENCOUNTER
Caller: Pilar Mesa    Relationship: Self    Best call back number: [phone]    What is the best time to reach you: ASAP    Who are you requesting to speak with (clinical staff, provider,  specific staff member):     Do you know the name of the person who called:     What was the call regarding: PT WOULD LIKE TO R/S APPT    Do you require a callback: YES

## 2022-05-04 ENCOUNTER — INFUSION (OUTPATIENT)
Dept: ONCOLOGY | Facility: HOSPITAL | Age: 52
End: 2022-05-04

## 2022-05-04 VITALS
TEMPERATURE: 97.1 F | RESPIRATION RATE: 16 BRPM | DIASTOLIC BLOOD PRESSURE: 64 MMHG | SYSTOLIC BLOOD PRESSURE: 104 MMHG | HEART RATE: 68 BPM | WEIGHT: 185 LBS | HEIGHT: 68 IN | BODY MASS INDEX: 28.04 KG/M2 | OXYGEN SATURATION: 100 %

## 2022-05-04 DIAGNOSIS — D50.9 IRON DEFICIENCY ANEMIA, UNSPECIFIED IRON DEFICIENCY ANEMIA TYPE: Primary | ICD-10-CM

## 2022-05-04 DIAGNOSIS — K90.9 MALABSORPTION SYNDROME: ICD-10-CM

## 2022-05-04 PROCEDURE — 96365 THER/PROPH/DIAG IV INF INIT: CPT

## 2022-05-04 PROCEDURE — 25010000002 IRON SUCROSE PER 1 MG: Performed by: INTERNAL MEDICINE

## 2022-05-04 RX ORDER — FAMOTIDINE 10 MG/ML
20 INJECTION, SOLUTION INTRAVENOUS AS NEEDED
Status: DISCONTINUED | OUTPATIENT
Start: 2022-05-04 | End: 2022-05-04 | Stop reason: HOSPADM

## 2022-05-04 RX ORDER — SODIUM CHLORIDE 9 MG/ML
250 INJECTION, SOLUTION INTRAVENOUS ONCE
Status: COMPLETED | OUTPATIENT
Start: 2022-05-04 | End: 2022-05-04

## 2022-05-04 RX ORDER — DIPHENHYDRAMINE HYDROCHLORIDE 50 MG/ML
50 INJECTION INTRAMUSCULAR; INTRAVENOUS AS NEEDED
Status: DISCONTINUED | OUTPATIENT
Start: 2022-05-04 | End: 2022-05-04 | Stop reason: HOSPADM

## 2022-05-04 RX ORDER — ACETAMINOPHEN 325 MG/1
650 TABLET ORAL ONCE
Status: COMPLETED | OUTPATIENT
Start: 2022-05-04 | End: 2022-05-04

## 2022-05-04 RX ADMIN — ACETAMINOPHEN 650 MG: 325 TABLET ORAL at 09:14

## 2022-05-04 RX ADMIN — IRON SUCROSE 200 MG: 20 INJECTION, SOLUTION INTRAVENOUS at 09:15

## 2022-05-04 RX ADMIN — SODIUM CHLORIDE 250 ML: 9 INJECTION, SOLUTION INTRAVENOUS at 09:15

## 2022-05-11 ENCOUNTER — APPOINTMENT (OUTPATIENT)
Dept: ONCOLOGY | Facility: HOSPITAL | Age: 52
End: 2022-05-11

## 2022-05-18 ENCOUNTER — APPOINTMENT (OUTPATIENT)
Dept: ONCOLOGY | Facility: HOSPITAL | Age: 52
End: 2022-05-18

## 2022-05-19 ENCOUNTER — INFUSION (OUTPATIENT)
Dept: ONCOLOGY | Facility: HOSPITAL | Age: 52
End: 2022-05-19

## 2022-05-19 VITALS
OXYGEN SATURATION: 100 % | BODY MASS INDEX: 27.95 KG/M2 | SYSTOLIC BLOOD PRESSURE: 100 MMHG | WEIGHT: 184.4 LBS | RESPIRATION RATE: 17 BRPM | HEART RATE: 67 BPM | TEMPERATURE: 98 F | DIASTOLIC BLOOD PRESSURE: 55 MMHG | HEIGHT: 68 IN

## 2022-05-19 DIAGNOSIS — K90.9 MALABSORPTION SYNDROME: ICD-10-CM

## 2022-05-19 DIAGNOSIS — D50.9 IRON DEFICIENCY ANEMIA, UNSPECIFIED IRON DEFICIENCY ANEMIA TYPE: Primary | ICD-10-CM

## 2022-05-19 PROCEDURE — 96365 THER/PROPH/DIAG IV INF INIT: CPT

## 2022-05-19 PROCEDURE — 25010000002 IRON SUCROSE PER 1 MG: Performed by: INTERNAL MEDICINE

## 2022-05-19 RX ORDER — ACETAMINOPHEN 325 MG/1
650 TABLET ORAL ONCE
Status: COMPLETED | OUTPATIENT
Start: 2022-05-19 | End: 2022-05-19

## 2022-05-19 RX ORDER — SODIUM CHLORIDE 9 MG/ML
250 INJECTION, SOLUTION INTRAVENOUS ONCE
Status: COMPLETED | OUTPATIENT
Start: 2022-05-19 | End: 2022-05-19

## 2022-05-19 RX ORDER — FAMOTIDINE 10 MG/ML
20 INJECTION, SOLUTION INTRAVENOUS AS NEEDED
Status: DISCONTINUED | OUTPATIENT
Start: 2022-05-19 | End: 2022-05-19 | Stop reason: HOSPADM

## 2022-05-19 RX ORDER — DIPHENHYDRAMINE HYDROCHLORIDE 50 MG/ML
50 INJECTION INTRAMUSCULAR; INTRAVENOUS AS NEEDED
Status: DISCONTINUED | OUTPATIENT
Start: 2022-05-19 | End: 2022-05-19 | Stop reason: HOSPADM

## 2022-05-19 RX ADMIN — IRON SUCROSE 200 MG: 20 INJECTION, SOLUTION INTRAVENOUS at 08:57

## 2022-05-19 RX ADMIN — SODIUM CHLORIDE 250 ML: 9 INJECTION, SOLUTION INTRAVENOUS at 08:40

## 2022-05-19 RX ADMIN — ACETAMINOPHEN 650 MG: 325 TABLET ORAL at 08:46

## 2022-06-21 ENCOUNTER — OFFICE VISIT (OUTPATIENT)
Dept: BARIATRICS/WEIGHT MGMT | Facility: CLINIC | Age: 52
End: 2022-06-21

## 2022-06-21 VITALS
TEMPERATURE: 98.6 F | DIASTOLIC BLOOD PRESSURE: 85 MMHG | WEIGHT: 182.2 LBS | SYSTOLIC BLOOD PRESSURE: 129 MMHG | HEIGHT: 66 IN | BODY MASS INDEX: 29.28 KG/M2 | HEART RATE: 94 BPM | OXYGEN SATURATION: 98 %

## 2022-06-21 DIAGNOSIS — E66.09 CLASS 1 OBESITY DUE TO EXCESS CALORIES WITH SERIOUS COMORBIDITY AND BODY MASS INDEX (BMI) OF 33.0 TO 33.9 IN ADULT: Primary | ICD-10-CM

## 2022-06-21 DIAGNOSIS — E78.00 HIGH CHOLESTEROL: ICD-10-CM

## 2022-06-21 DIAGNOSIS — I10 ESSENTIAL HYPERTENSION: ICD-10-CM

## 2022-06-21 DIAGNOSIS — K21.00 GASTROESOPHAGEAL REFLUX DISEASE WITH ESOPHAGITIS WITHOUT HEMORRHAGE: ICD-10-CM

## 2022-06-21 PROCEDURE — 99213 OFFICE O/P EST LOW 20 MIN: CPT | Performed by: NURSE PRACTITIONER

## 2022-06-21 NOTE — PROGRESS NOTES
"Metabolic and Bariatric Surgery Adult Nutrition Assessment    Patient Name: Pilar Pope   YOB: 1970   MRN: [id number]     Assessment Date:  06/21/2022     Reason for Visit: Follow-up Nutrition Assessment     Treatment Pathway: Medical Weight Loss    Assessment    Anthropometrics   Wt Readings from Last 1 Encounters:   06/21/22 82.6 kg (182 lb 3.2 oz)     Ht Readings from Last 1 Encounters:   06/21/22 168.3 cm (66.25\")     BMI Readings from Last 1 Encounters:   06/21/22 29.19 kg/m²        Initial Weight/Date: 208.6 lbs (6/23/21)  Weight Changes since last visit: +6.4 lbs  Net Weight Change: -26.4 lbs     Past Medical History:   Diagnosis Date   • GERD (gastroesophageal reflux disease)    • High cholesterol    • Iron deficiency anemia, unspecified 8/5/2021   • Malabsorption syndrome 8/5/2021   • Skin cancer 2010      Past Surgical History:   Procedure Laterality Date   • APPENDECTOMY     • CHOLECYSTECTOMY  2017   • LYMPH NODE BIOPSY     • POSTPARTUM TUBAL LIGATION     • SINUS SURGERY     • SKIN CANCER EXCISION        Current Outpatient Medications   Medication Sig Dispense Refill   • busPIRone (BUSPAR) 10 MG tablet Take 10 mg by mouth 3 (Three) Times a Day.     • hydroCHLOROthiazide (HYDRODIURIL) 25 MG tablet Take 25 mg by mouth Daily.     • lisinopril (PRINIVIL,ZESTRIL) 40 MG tablet Take 40 mg by mouth Daily.     • pantoprazole (PROTONIX) 20 MG EC tablet Take 20 mg by mouth Daily.     • sertraline (ZOLOFT) 25 MG tablet Take 25 mg by mouth Daily.       No current facility-administered medications for this visit.      Allergies   Allergen Reactions   • Neosporin Plus Max St Rash        Motivation for weight loss includes: To buy jeans at [employer]. Improved clothing fit. Being healthier- to live longer, see children and grandchildren grow up.     Pertinent Social/Behavior/Environmental History: Lives with 3 children (16, 20, 30)     Nutrition Recall  Eating 3 meals daily   (M1) 6am: 2eggs, 1/2 cereal, " cooked mushrooms & onions  (M2) Crab meat and lettuce. No carb tortilla shell. Bag of carrots.   (M3) missing this meal.   (M4) grilled chicken with cucumbers and broccoli   Snacking - emotion connection snacking on candy.   Monitoring portions- using measuring cups  Calculating Protein- not calculating.   Drinking sugary/carbonated beverages- occasionally.   Fluid Intake- 52 oz water yesterday    Success this Month: recognizing emotional eating cues.   Barriers: depression and emotional eating- is working with a counselor. Missing meal 3.     Exercise: walking daily for ~45 minutes      Nutrition Intervention  Nutrition education and nutrition coaching for behavior change provided.  Strategies used included Comprehensive education, Motivational Interviewing , Problem Solving, Skill Development for meal planning & strategies to overcoming emotional eating (in addtion to encouraging work with counselor), and Ongoing reinforcement  Review of medical weight loss prescription 4 meal/day plan and reviewed nutritional needs for Medical Weight Loss.    Diet Changes  Eat 4 meals per day with protein and vegetables at each meal, no carbs after meal 2., Protein goal: 65 gms., Eat vegetables first at each meal., Discussed protein guidelines for shakes and bars., Reduce snacking -use foods from free foods list only., Reduce fat, sugar, and/or salt in food choices., Choose more nutrient dense foods., Choose foods with increased fiber., Monitor portion sizes using a food scale and/or measuring cup., Eliminate soda and sugar-sweetened beverages and Increase fluid intake to 64 ounces per day      Goals  1. Drink more water, minimum goal 64 oz. Plan is to have 1 bottle of water before work, 1 bottle of water at work before lunch, 1 bottle of water at work after lunch, and an additional bottle of water at home.    2. Have a shake or bar with vegetables at meal 3 during 12 min afternoon work break.   3. On Sundays will cook 1-2  protein choices that can be readily available at home for supper and keep a bag of steam able veggies in freezer for as needed nights.     Self-monitoring strategies such as keeping a food journal (on paper or electronically) and calculating fluid/protein intake were discussed.    Monitoring/Evaluation Plan  Anticipate follow up per program protocol. Continue collaboration of care with physician and treatment team.     Electronically signed by  Leora Way RDN, LD  06/21/2022 08:52 CDT.

## 2022-06-21 NOTE — PROGRESS NOTES
"Patient Care Team:  Rahul Griggs DO as PCP - General (Family Medicine)    Reason for Visit:  Medical Weight loss    Subjective   Pilar Pope is a 52 y.o. female.     Pilar is here for follow-up and continued medical management of her morbid obesity.  She is currently on a prescription diet.  Pilar previously was to apply dietary changes such as following the meal plan as directed.  She admits to eating 3 meals per day.  As a result she gained weight since her last visit.    She is actively seeing counseling every 2 weeks with [facility]. She states she is working on emotionally eating and feels her counselor will assist with this.     Review Of Systems:  Review of Systems   Constitutional: Negative.    Respiratory: Negative.    Cardiovascular: Negative.    Gastrointestinal: Negative.    Endocrine: Negative.    Musculoskeletal: Negative.    Neurological: Positive for headache.   Psychiatric/Behavioral: Positive for depressed mood. The patient is nervous/anxious.          The following portions of the patient's history were reviewed and updated as appropriate: allergies, current medications, past family history, past medical history, past social history, past surgical history, and problem list.    Objective   /85 (BP Location: Right arm, Patient Position: Sitting, Cuff Size: Adult)   Pulse 94   Temp 98.6 °F (37 °C)   Ht 168.3 cm (66.25\")   Wt 82.6 kg (182 lb 3.2 oz)   SpO2 98%   BMI 29.19 kg/m²       06/21/22  0827   Weight: 82.6 kg (182 lb 3.2 oz)       Physical Exam  Vitals reviewed.   Constitutional:       Appearance: She is obese.   Cardiovascular:      Rate and Rhythm: Normal rate and regular rhythm.   Pulmonary:      Effort: Pulmonary effort is normal.   Skin:     General: Skin is warm and dry.   Neurological:      Mental Status: She is alert and oriented to person, place, and time.   Psychiatric:         Mood and Affect: Mood normal.         Behavior: Behavior normal. "         BMI is >= 25 and <30. (Overweight) The following options were offered after discussion;: weight loss educational material (shared in after visit summary), exercise counseling/recommendations and nutrition counseling/recommendations     Assessment & Plan   Diagnoses and all orders for this visit:    1. Class 1 obesity due to excess calories with serious comorbidity and body mass index (BMI) of 33.0 to 33.9 in adult (Primary)  Comments:  BMI is down to 29 at this time. Meal plan reviewed     2. Essential hypertension  Assessment & Plan:  Hypertension is unchanged.  Continue current treatment regimen.  Dietary sodium restriction.  Weight loss.  Blood pressure will be reassessed at the next regular appointment.      3. High cholesterol  Assessment & Plan:  Lipid abnormalities are unchanged.  Nutritional counseling was provided. and The patient was referred to the dietician.  Lipids will be reassessed with PCP.      4. Gastroesophageal reflux disease with esophagitis without hemorrhage       Pilar Pope was seen today for follow-up, obesity, nutrition counseling and weight loss.  She has lost weight since her last visit.  Today we discussed healthy changes in lifestyle, diet, and exercise. Dietician consultation obtained.  Pilar Pope had received handouts to her explaining the recommendation on portion sizes/appetite control/reading nutrition labels.   Intensive behavioral therapy for obesity was done today as well.     Goals for this month are:   1. Add vegetables at first meal   2. Work towards getting all four meals in, discussed protein for third meal.   3. Continue counseling    Follow up in one month for a weight recheck.A total of 20 minutes was spent face to face with this patient and over half of the time was spent on counseling and coordination of care for the disease of obesity. We specifically reviewed the dietary prescription and I made recommendations toward increasing exercise as  tolerated as well as focusing on training their behavior toward storing less.

## 2022-07-19 ENCOUNTER — LAB (OUTPATIENT)
Dept: LAB | Facility: HOSPITAL | Age: 52
End: 2022-07-19

## 2022-07-19 ENCOUNTER — TELEPHONE (OUTPATIENT)
Dept: ONCOLOGY | Facility: CLINIC | Age: 52
End: 2022-07-19

## 2022-07-19 DIAGNOSIS — D50.0 IRON DEFICIENCY ANEMIA DUE TO CHRONIC BLOOD LOSS: ICD-10-CM

## 2022-07-19 LAB
ALBUMIN SERPL-MCNC: 4.4 G/DL (ref 3.5–5.2)
ALBUMIN/GLOB SERPL: 1.5 G/DL
ALP SERPL-CCNC: 71 U/L (ref 39–117)
ALT SERPL W P-5'-P-CCNC: 19 U/L (ref 1–33)
ANION GAP SERPL CALCULATED.3IONS-SCNC: 10 MMOL/L (ref 5–15)
AST SERPL-CCNC: 17 U/L (ref 1–32)
BASOPHILS # BLD AUTO: 0.06 10*3/MM3 (ref 0–0.2)
BASOPHILS NFR BLD AUTO: 1 % (ref 0–1.5)
BILIRUB SERPL-MCNC: 0.2 MG/DL (ref 0–1.2)
BUN SERPL-MCNC: 15 MG/DL (ref 6–20)
BUN/CREAT SERPL: 21.1 (ref 7–25)
CALCIUM SPEC-SCNC: 10 MG/DL (ref 8.6–10.5)
CHLORIDE SERPL-SCNC: 101 MMOL/L (ref 98–107)
CO2 SERPL-SCNC: 28 MMOL/L (ref 22–29)
CREAT SERPL-MCNC: 0.71 MG/DL (ref 0.57–1)
DEPRECATED RDW RBC AUTO: 56.8 FL (ref 37–54)
EGFRCR SERPLBLD CKD-EPI 2021: 102.5 ML/MIN/1.73
EOSINOPHIL # BLD AUTO: 0.25 10*3/MM3 (ref 0–0.4)
EOSINOPHIL NFR BLD AUTO: 4.3 % (ref 0.3–6.2)
ERYTHROCYTE [DISTWIDTH] IN BLOOD BY AUTOMATED COUNT: 17.7 % (ref 12.3–15.4)
FERRITIN SERPL-MCNC: 17.41 NG/ML (ref 13–150)
GLOBULIN UR ELPH-MCNC: 2.9 GM/DL
GLUCOSE SERPL-MCNC: 92 MG/DL (ref 65–99)
HCT VFR BLD AUTO: 42.6 % (ref 34–46.6)
HGB BLD-MCNC: 13.2 G/DL (ref 12–15.9)
IMM GRANULOCYTES # BLD AUTO: 0.02 10*3/MM3 (ref 0–0.05)
IMM GRANULOCYTES NFR BLD AUTO: 0.3 % (ref 0–0.5)
IRON 24H UR-MRATE: 54 MCG/DL (ref 37–145)
IRON SATN MFR SERPL: 12 % (ref 20–50)
LYMPHOCYTES # BLD AUTO: 1.27 10*3/MM3 (ref 0.7–3.1)
LYMPHOCYTES NFR BLD AUTO: 22 % (ref 19.6–45.3)
MCH RBC QN AUTO: 26.9 PG (ref 26.6–33)
MCHC RBC AUTO-ENTMCNC: 31 G/DL (ref 31.5–35.7)
MCV RBC AUTO: 86.8 FL (ref 79–97)
MONOCYTES # BLD AUTO: 0.36 10*3/MM3 (ref 0.1–0.9)
MONOCYTES NFR BLD AUTO: 6.3 % (ref 5–12)
NEUTROPHILS NFR BLD AUTO: 3.8 10*3/MM3 (ref 1.7–7)
NEUTROPHILS NFR BLD AUTO: 66.1 % (ref 42.7–76)
NRBC BLD AUTO-RTO: 0 /100 WBC (ref 0–0.2)
PLATELET # BLD AUTO: 200 10*3/MM3 (ref 140–450)
PMV BLD AUTO: 11 FL (ref 6–12)
POTASSIUM SERPL-SCNC: 3.1 MMOL/L (ref 3.5–5.2)
PROT SERPL-MCNC: 7.3 G/DL (ref 6–8.5)
RBC # BLD AUTO: 4.91 10*6/MM3 (ref 3.77–5.28)
SODIUM SERPL-SCNC: 139 MMOL/L (ref 136–145)
TIBC SERPL-MCNC: 463 MCG/DL (ref 298–536)
TRANSFERRIN SERPL-MCNC: 311 MG/DL (ref 200–360)
WBC NRBC COR # BLD: 5.76 10*3/MM3 (ref 3.4–10.8)

## 2022-07-19 PROCEDURE — 82728 ASSAY OF FERRITIN: CPT

## 2022-07-19 PROCEDURE — 85025 COMPLETE CBC W/AUTO DIFF WBC: CPT

## 2022-07-19 PROCEDURE — 83540 ASSAY OF IRON: CPT

## 2022-07-19 PROCEDURE — 84466 ASSAY OF TRANSFERRIN: CPT

## 2022-07-19 PROCEDURE — 36415 COLL VENOUS BLD VENIPUNCTURE: CPT

## 2022-07-19 PROCEDURE — 80053 COMPREHEN METABOLIC PANEL: CPT

## 2022-07-19 NOTE — TELEPHONE ENCOUNTER
----- Message from Aníbal Lizarraga MD sent at 7/19/2022  8:43 AM CDT -----  Fax CMP to PCP.  Potassium 3.1.    Low iron sat without anemia, observe.

## 2022-07-21 PROBLEM — E61.1 IRON DEFICIENCY: Status: RESOLVED | Noted: 2021-06-15 | Resolved: 2022-07-21

## 2022-07-21 PROBLEM — D64.9 ANEMIA: Status: RESOLVED | Noted: 2021-06-15 | Resolved: 2022-07-21

## 2022-07-21 PROBLEM — D50.0 IRON DEFICIENCY ANEMIA DUE TO CHRONIC BLOOD LOSS: Status: ACTIVE | Noted: 2022-07-21

## 2022-07-21 NOTE — PROGRESS NOTES
[facility] GROUP HEMATOLOGY & ONCOLOGY  [address]  [facility] [address] [phone]    Patient Name: Pilar Pope  Encounter Date: 07/26/2022  YOB: 1970  Patient Number: [id number]      REASON FOR FOLLOW-UP: Pilar Mesa is a pleasant 52 y.o.  female who is seen on follow-up for microcytic anemia from iron deficiency from menorrhagia.  She had her latest of iron sucrose completed 2.5 months ago.  She is intolerant to oral iron (diarrhea and stomach ache).  She is seen alone.  She is a reliable historian.           Problem List Items Addressed This Visit        Other    Iron deficiency anemia due to chronic blood loss - Primary    Overview     DIAGNOSTIC ABNORMALITIES:  WBC 9, hemoglobin 8.7, hematocrit 30.5, MCV 71 and platelet 394 on 07/29/2020.  WBC 5.9, hemoglobin 10.5, hematocrit 36.7, MCV 77.3 and platelet 332 on 06/15/2021.  Her CMP was normal except for nonfasting glucose of 117.  Reticulocyte count 2.14. Iron profile found saturation of 5% and ferritin of 13.07.  Her B12 level was 978 with a normal folate of 16.7.    Negative flow cytometry 06/18/2021.  Bone marrow biopsy 07/13/2021.Bone marrow, left posterior iliac crest, aspirate smears, aspirate clot and core biopsy:  A. Normocellular bone marrow (estimated at 50% cellularity).  B. Myeloid to erythroid ratio appears to be within normal limits in the marrow.  C. Maturing myelopoiesis without maturation arrest.  D. No obvious an increase in plasma cells or lymphocytes in the marrow.  E. Adequate megakaryocytes in the marrow.  F. No stainable iron in the marrow.  G. No evidence of metastatic carcinoma or granulomatous disease in the marrow.  Negative flow cytometry and cytogenetics 46XX.  Negative MDS panel.        PREVIOUS INTERVENTIONS:  Oral steroid by Dr. Tamez. ?  Indication for 1 month.  Venofer 200 mg 07/13/2021, 12/06/2021, 12/08/2021, 12/10/2021,  05/04/2022 and 05/19/2022.               Oncology/Hematology History    No history exists.       PAST MEDICAL HISTORY:  ALLERGIES:  Allergies   Allergen Reactions   • Neosporin Plus Max St Rash     CURRENT MEDICATIONS:  Outpatient Encounter Medications as of 7/26/2022   Medication Sig Dispense Refill   • busPIRone (BUSPAR) 10 MG tablet Take 10 mg by mouth 3 (Three) Times a Day.     • hydroCHLOROthiazide (HYDRODIURIL) 25 MG tablet Take 25 mg by mouth Daily.     • lisinopril (PRINIVIL,ZESTRIL) 40 MG tablet Take 40 mg by mouth Daily.     • pantoprazole (PROTONIX) 20 MG EC tablet Take 20 mg by mouth Daily.     • sertraline (ZOLOFT) 25 MG tablet Take 25 mg by mouth Daily.       No facility-administered encounter medications on file as of 7/26/2022.     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   • Class 1 obesity due to excess calories with serious comorbidity and body mass index (BMI) of 33.0 to 33.9 in adult E66.09, Z68.33   • GERD (gastroesophageal reflux disease) K21.9   • Malignant melanoma of overlapping sites (HCC) C43.8   • High cholesterol E78.00   • Iron deficiency anemia, unspecified D50.9   • Malabsorption syndrome K90.9   • Overweight with body mass index (BMI) of 28 to 28.9 in adult E66.3, Z68.28   • Essential hypertension I10   • Iron deficiency anemia due to chronic blood loss D50.0     SURGERIES:  Past Surgical History:   Procedure Laterality Date   • APPENDECTOMY     • CHOLECYSTECTOMY  2017   • LYMPH NODE BIOPSY     • POSTPARTUM TUBAL LIGATION     • SINUS SURGERY     • SKIN CANCER EXCISION       HEALTH MAINTENANCE ITEMS:  Health Maintenance Due   Topic Date Due   • MAMMOGRAM  Never done   • COLORECTAL CANCER SCREENING  Never done   • ANNUAL PHYSICAL  Never done   • TDAP/TD VACCINES (1 - Tdap) Never done   • ZOSTER VACCINE (1 of 2) Never done   • HEPATITIS C SCREENING  Never done   • PAP  "SMEAR  Never done   • LIPID PANEL  Never done       <no information>  Last Completed Colonoscopy     This patient has no relevant Health Maintenance data.          There is no immunization history on file for this patient.  Last Completed Mammogram     This patient has no relevant Health Maintenance data.            FAMILY HISTORY:  Family History   Problem Relation Age of Onset   • Heart disease Father    • Lung cancer Father      SOCIAL HISTORY:  Social History     Socioeconomic History   • Marital status:    Tobacco Use   • Smoking status: Never Smoker   • Smokeless tobacco: Never Used       REVIEW OF SYSTEMS:    Review of Systems   Constitutional: Positive for fatigue. Negative for chills and fever.        \"I feel tired.\"   HENT: Negative for congestion, nosebleeds and trouble swallowing.    Eyes: Negative for redness and visual disturbance.   Respiratory: Negative for cough, shortness of breath and wheezing.    Cardiovascular: Negative for chest pain and leg swelling.   Gastrointestinal: Negative for blood in stool, nausea and vomiting.   Endocrine: Negative for polydipsia and polyphagia.   Genitourinary: Negative for difficulty urinating and hematuria.        \"I go through 6-7 pads a day. It varies.\"   Musculoskeletal: Negative for gait problem and myalgias.   Skin: Negative for pallor.   Allergic/Immunologic: Negative for food allergies.   Neurological: Negative for facial asymmetry, speech difficulty and confusion.   Hematological: Negative for adenopathy.   Psychiatric/Behavioral: Negative for agitation and hallucinations. The patient is not nervous/anxious.          VITAL SIGNS: /72   Pulse 80   Temp 97.6 °F (36.4 °C)   Resp 18   Ht 168.3 cm (66.25\")   Wt 82.9 kg (182 lb 11.2 oz)   SpO2 98%   Breastfeeding No   BMI 29.27 kg/m²  Body surface area is 1.93 meters squared.   Pain Score    07/26/22 0801   PainSc: 0-No pain         PHYSICAL EXAMINATION:     Physical Exam  Vitals reviewed. "   Constitutional:       General: She is not in acute distress.  HENT:      Head: Normocephalic and atraumatic.   Eyes:      General: No scleral icterus.  Cardiovascular:      Rate and Rhythm: Normal rate.   Pulmonary:      Effort: No respiratory distress.      Breath sounds: No wheezing or rales.   Abdominal:      General: Bowel sounds are normal.      Palpations: Abdomen is soft.      Tenderness: There is no abdominal tenderness.   Musculoskeletal:         General: No swelling.      Cervical back: Neck supple.   Skin:     General: Skin is warm and dry.      Coloration: Skin is not pale.   Neurological:      Mental Status: She is alert and oriented to person, place, and time.   Psychiatric:         Mood and Affect: Mood normal.         Behavior: Behavior normal.         Thought Content: Thought content normal.         Judgment: Judgment normal.         LABS    Lab Results - Last 18 Months   Lab Units 07/19/22  0734 04/26/22  0847 01/25/22  1529 11/04/21  0802 10/05/21  0806 08/05/21  0959 07/19/21  1248 07/07/21  0938 06/15/21  0910   HEMOGLOBIN g/dL 13.2 11.8* 13.1 12.1 11.3* 10.4* 9.5* 10.6* 10.5*   HEMATOCRIT % 42.6 38.3 40.7 39.6 36.9 34.6 31.5* 34.5 36.7   MCV fL 86.8 85.7 88.3 83.0 82.4 79.5 78.6* 75.3* 77.3*   WBC 10*3/mm3 5.76 6.38 6.66 4.44 4.06 9.26 6.47 4.92 5.98   RDW % 17.7* 13.1 17.9* 16.3* 17.6* 18.4* 20.8* 20.4* 22.2*   MPV fL 11.0 11.0 11.4 10.3 10.4 9.4 10.4 10.9 10.5   PLATELETS 10*3/mm3 200 305 253 264 261 283 267 252 332   IMM GRAN % % 0.3 0.3 0.3 0.2 0.2 0.1 0.3 0.0 0.2   NEUTROS ABS 10*3/mm3 3.80 4.29 4.47 2.83 2.61 7.77* 4.62 2.99 3.92   LYMPHS ABS 10*3/mm3 1.27 1.28 1.48 1.00 1.01 0.86 1.22 1.25 1.32   MONOS ABS 10*3/mm3 0.36 0.52 0.45 0.36 0.18 0.51 0.38 0.39 0.42   EOS ABS 10*3/mm3 0.25 0.21 0.20 0.21 0.23 0.07 0.18 0.24 0.26   BASOS ABS 10*3/mm3 0.06 0.06 0.04 0.03 0.02 0.04 0.05 0.05 0.05   IMMATURE GRANS (ABS) 10*3/mm3 0.02 0.02 0.02 0.01 0.01 0.01 0.02 0.00 0.01   NRBC /100 WBC 0.0 0.0  0.0  --   --   --  0.0 0.0 0.0       Lab Results - Last 18 Months   Lab Units 07/19/22  0734 04/26/22  0847 11/04/21  0802 10/05/21  0806 08/05/21  0959 07/19/21  1248 06/15/21  0910   GLUCOSE mg/dL 92 87 68 152* 112* 102* 117*   SODIUM mmol/L 139 140 142 137 135* 136 136   POTASSIUM mmol/L 3.1* 3.5 3.8 3.5 3.7 3.7 4.0   CO2 mmol/L 28.0 26.0 28.0 24.0 23.0 23.0 26.0   CHLORIDE mmol/L 101 104 103 103 101 101 101   ANION GAP mmol/L 10.0 10.0 11.0 10.0 11.0 12.0 9.0   CREATININE mg/dL 0.71 0.56* 0.62 0.69 0.60 0.64 0.60   BUN mg/dL 15 15 17 15 15 14 13   BUN / CREAT RATIO  21.1 26.8* 27.4* 21.7 25.0 21.9 21.7   CALCIUM mg/dL 10.0 9.9 10.5 9.3 9.7 9.7 10.0   EGFR IF NONAFRICN AM mL/min/1.73  --   --  101 90 105 98 105   ALK PHOS U/L 71 83 86 73 71 72 103   TOTAL PROTEIN g/dL 7.3 7.3 7.5 7.1 7.2 7.0 7.5   ALT (SGPT) U/L 19 18 25 28 22 20 17   AST (SGOT) U/L 17 17 19 19 15 20 17   BILIRUBIN mg/dL 0.2 0.2 0.3 0.3 0.2 0.2 0.3   ALBUMIN g/dL 4.40 4.70 4.80 4.40 4.40 4.20 4.10   GLOBULIN gm/dL 2.9 2.6 2.7 2.7 2.8 2.8 3.4       Lab Results - Last 18 Months   Lab Units 06/16/21  0701   REFERENCE LAB REPORT  See Attached Result        Lab Results - Last 18 Months   Lab Units 07/19/22  0734 04/26/22  0847 01/25/22  1529 11/04/21  0802 10/05/21  0806 06/15/21  0910   IRON mcg/dL 54 24* 53 53 49 29*   TIBC mcg/dL 463 580* 472 532 451 539*   IRON SATURATION % 12* 4* 11* 10* 11* 5*   FERRITIN ng/mL 17.41 5.78* 36.95 12.36* 14.01 13.07   FOLATE ng/mL  --   --   --   --   --  16.70         Pilar Wellington Niko reports a pain score of 0.        ASSESSMENT:  1.  Anemia, microcytic, secondary to iron deficiency.  Colonoscopy by Dr. Mando Quezada was negative per patient.  Treatment status:  Intolerant to oral iron.  2.  Iron deficiency from menorrhagia.  3.  Menorrhagia followed by Dr. Bernal.  4.  Melanoma, abdominal wall 09/2010.  Followed by Dr. Layla Olea.  Breslow thickness 0.63 mm, Julio César's level 4.  Treatment status: Post excision and  "sentinel node biopsy by Dr. Mayer. No adjuvant therapy required.            PLAN:  1.   Re:  Note from Ms. Conley, APRN on 06/21/2022.  She is followed for medical weight loss.  Healthy changes in lifestyle, diet and exercise as well as dietitian consultation were obtained.  2.   Re: 2 doses of IV iron needed since her last visit.  It was well-tolerated.  \"No reactions.\"  3.   Re: Heme status.  WBC 5.7, platelet 200, MCV 86.8, normal hemoglobin 13.2, normal hematocrit 42.6.  4.   Re: CMP. .5.  Potassium 3.1, faxed to PCP.  5.   Re: Ferritin 17.4 and iron saturation 12%.  She is having menorrhagia.  Will give one dose of Venofer, high risk for recurrent anemia due to ongoing heavy periods and low iron saturation.   6.  Stool for occult blood times 3 due to persistent anemia.   7.  CBC with differential, ferritin and iron profile every 6 weeks.  8.  Continue care per primary care physician and other specialist.  9.  Plan of care discussed with patient.  Understanding expressed.  Patient agreeable to proceed.  10. Stable for observation.   11.  Follow up with Dr. Bernal 08/04/2022. \"Periods are pretty heavy.\"  12.  Venofer 200 mg one dose today. Premed Tylenol 500 mg po. Observe for infusion reactions.   13.  Return to office in 3 months with CMP, CBC with differential, ferritin and iron profile, same day.         I have reviewed the assessment and plan and verified the accuracy of it. No changes to assessment and plan since the information was documented. Aníbal Lizarraga MD 07/26/22        I spent 31 total minutes, face-to-face, caring for Pilar byrd.  Greater than 50% of this time involved counseling and/or coordination of care as documented within this note regarding the patient's illness(es), pros and cons of various treatment options, instructions and/or risk reduction.              (Edna Gilmore MD)  DO Suzanne Zambrano MD  (Layla Olea MD)  "

## 2022-07-26 ENCOUNTER — INFUSION (OUTPATIENT)
Dept: ONCOLOGY | Facility: HOSPITAL | Age: 52
End: 2022-07-26

## 2022-07-26 ENCOUNTER — OFFICE VISIT (OUTPATIENT)
Dept: ONCOLOGY | Facility: CLINIC | Age: 52
End: 2022-07-26

## 2022-07-26 VITALS
BODY MASS INDEX: 29.36 KG/M2 | OXYGEN SATURATION: 98 % | RESPIRATION RATE: 18 BRPM | TEMPERATURE: 97.6 F | DIASTOLIC BLOOD PRESSURE: 72 MMHG | HEART RATE: 80 BPM | HEIGHT: 66 IN | SYSTOLIC BLOOD PRESSURE: 114 MMHG | WEIGHT: 182.7 LBS

## 2022-07-26 VITALS
DIASTOLIC BLOOD PRESSURE: 64 MMHG | RESPIRATION RATE: 18 BRPM | SYSTOLIC BLOOD PRESSURE: 105 MMHG | BODY MASS INDEX: 27.58 KG/M2 | TEMPERATURE: 96.9 F | WEIGHT: 182 LBS | HEIGHT: 68 IN | OXYGEN SATURATION: 100 % | HEART RATE: 68 BPM

## 2022-07-26 DIAGNOSIS — D50.0 IRON DEFICIENCY ANEMIA DUE TO CHRONIC BLOOD LOSS: Primary | ICD-10-CM

## 2022-07-26 DIAGNOSIS — K90.9 MALABSORPTION SYNDROME: ICD-10-CM

## 2022-07-26 DIAGNOSIS — D50.9 IRON DEFICIENCY ANEMIA, UNSPECIFIED IRON DEFICIENCY ANEMIA TYPE: Primary | ICD-10-CM

## 2022-07-26 PROCEDURE — 99214 OFFICE O/P EST MOD 30 MIN: CPT | Performed by: INTERNAL MEDICINE

## 2022-07-26 PROCEDURE — 25010000002 IRON SUCROSE PER 1 MG: Performed by: INTERNAL MEDICINE

## 2022-07-26 PROCEDURE — 96365 THER/PROPH/DIAG IV INF INIT: CPT

## 2022-07-26 RX ORDER — FAMOTIDINE 10 MG/ML
20 INJECTION, SOLUTION INTRAVENOUS AS NEEDED
Status: DISCONTINUED | OUTPATIENT
Start: 2022-07-26 | End: 2022-07-26 | Stop reason: HOSPADM

## 2022-07-26 RX ORDER — SODIUM CHLORIDE 9 MG/ML
250 INJECTION, SOLUTION INTRAVENOUS ONCE
Status: COMPLETED | OUTPATIENT
Start: 2022-07-26 | End: 2022-07-26

## 2022-07-26 RX ORDER — DIPHENHYDRAMINE HYDROCHLORIDE 50 MG/ML
50 INJECTION INTRAMUSCULAR; INTRAVENOUS AS NEEDED
Status: DISCONTINUED | OUTPATIENT
Start: 2022-07-26 | End: 2022-07-26 | Stop reason: HOSPADM

## 2022-07-26 RX ORDER — FAMOTIDINE 10 MG/ML
20 INJECTION, SOLUTION INTRAVENOUS AS NEEDED
Status: CANCELLED | OUTPATIENT
Start: 2022-07-26

## 2022-07-26 RX ORDER — SODIUM CHLORIDE 9 MG/ML
250 INJECTION, SOLUTION INTRAVENOUS ONCE
Status: CANCELLED | OUTPATIENT
Start: 2022-07-26

## 2022-07-26 RX ORDER — DIPHENHYDRAMINE HYDROCHLORIDE 50 MG/ML
50 INJECTION INTRAMUSCULAR; INTRAVENOUS AS NEEDED
Status: CANCELLED | OUTPATIENT
Start: 2022-07-26

## 2022-07-26 RX ADMIN — SODIUM CHLORIDE 250 ML: 9 INJECTION, SOLUTION INTRAVENOUS at 09:32

## 2022-07-26 RX ADMIN — IRON SUCROSE 200 MG: 20 INJECTION, SOLUTION INTRAVENOUS at 09:33

## 2022-10-19 ENCOUNTER — LAB (OUTPATIENT)
Dept: LAB | Facility: HOSPITAL | Age: 52
End: 2022-10-19

## 2022-10-19 DIAGNOSIS — D50.0 IRON DEFICIENCY ANEMIA DUE TO CHRONIC BLOOD LOSS: ICD-10-CM

## 2022-10-19 LAB
BASOPHILS # BLD AUTO: 0.03 10*3/MM3 (ref 0–0.2)
BASOPHILS NFR BLD AUTO: 0.5 % (ref 0–1.5)
DEPRECATED RDW RBC AUTO: 45 FL (ref 37–54)
EOSINOPHIL # BLD AUTO: 0.3 10*3/MM3 (ref 0–0.4)
EOSINOPHIL NFR BLD AUTO: 4.7 % (ref 0.3–6.2)
ERYTHROCYTE [DISTWIDTH] IN BLOOD BY AUTOMATED COUNT: 13.2 % (ref 12.3–15.4)
FERRITIN SERPL-MCNC: 35.18 NG/ML (ref 13–150)
HCT VFR BLD AUTO: 40.8 % (ref 34–46.6)
HGB BLD-MCNC: 13.4 G/DL (ref 12–15.9)
IMM GRANULOCYTES # BLD AUTO: 0.02 10*3/MM3 (ref 0–0.05)
IMM GRANULOCYTES NFR BLD AUTO: 0.3 % (ref 0–0.5)
IRON 24H UR-MRATE: 93 MCG/DL (ref 37–145)
IRON SATN MFR SERPL: 21 % (ref 20–50)
LYMPHOCYTES # BLD AUTO: 0.87 10*3/MM3 (ref 0.7–3.1)
LYMPHOCYTES NFR BLD AUTO: 13.6 % (ref 19.6–45.3)
MCH RBC QN AUTO: 30.7 PG (ref 26.6–33)
MCHC RBC AUTO-ENTMCNC: 32.8 G/DL (ref 31.5–35.7)
MCV RBC AUTO: 93.4 FL (ref 79–97)
MONOCYTES # BLD AUTO: 0.46 10*3/MM3 (ref 0.1–0.9)
MONOCYTES NFR BLD AUTO: 7.2 % (ref 5–12)
NEUTROPHILS NFR BLD AUTO: 4.73 10*3/MM3 (ref 1.7–7)
NEUTROPHILS NFR BLD AUTO: 73.7 % (ref 42.7–76)
NRBC BLD AUTO-RTO: 0 /100 WBC (ref 0–0.2)
PLATELET # BLD AUTO: 210 10*3/MM3 (ref 140–450)
PMV BLD AUTO: 10.6 FL (ref 6–12)
RBC # BLD AUTO: 4.37 10*6/MM3 (ref 3.77–5.28)
TIBC SERPL-MCNC: 441 MCG/DL (ref 298–536)
TRANSFERRIN SERPL-MCNC: 296 MG/DL (ref 200–360)
WBC NRBC COR # BLD: 6.41 10*3/MM3 (ref 3.4–10.8)

## 2022-10-19 PROCEDURE — 84466 ASSAY OF TRANSFERRIN: CPT

## 2022-10-19 PROCEDURE — 83540 ASSAY OF IRON: CPT

## 2022-10-19 PROCEDURE — 82728 ASSAY OF FERRITIN: CPT

## 2022-10-19 PROCEDURE — 36415 COLL VENOUS BLD VENIPUNCTURE: CPT

## 2022-10-19 PROCEDURE — 85025 COMPLETE CBC W/AUTO DIFF WBC: CPT

## 2022-11-08 ENCOUNTER — OFFICE VISIT (OUTPATIENT)
Dept: ONCOLOGY | Facility: CLINIC | Age: 52
End: 2022-11-08

## 2022-11-08 VITALS
HEART RATE: 72 BPM | WEIGHT: 187.3 LBS | HEIGHT: 68 IN | BODY MASS INDEX: 28.39 KG/M2 | TEMPERATURE: 98.2 F | OXYGEN SATURATION: 97 % | SYSTOLIC BLOOD PRESSURE: 124 MMHG | DIASTOLIC BLOOD PRESSURE: 70 MMHG | RESPIRATION RATE: 18 BRPM

## 2022-11-08 DIAGNOSIS — D50.0 IRON DEFICIENCY ANEMIA DUE TO CHRONIC BLOOD LOSS: Primary | ICD-10-CM

## 2022-11-08 PROCEDURE — 99213 OFFICE O/P EST LOW 20 MIN: CPT | Performed by: INTERNAL MEDICINE

## 2023-01-25 ENCOUNTER — LAB (OUTPATIENT)
Dept: LAB | Facility: HOSPITAL | Age: 53
End: 2023-01-25
Payer: COMMERCIAL

## 2023-01-25 DIAGNOSIS — D50.0 IRON DEFICIENCY ANEMIA DUE TO CHRONIC BLOOD LOSS: ICD-10-CM

## 2023-01-25 LAB
ALBUMIN SERPL-MCNC: 4.1 G/DL (ref 3.5–5.2)
ALBUMIN/GLOB SERPL: 1.4 G/DL
ALP SERPL-CCNC: 86 U/L (ref 39–117)
ALT SERPL W P-5'-P-CCNC: 25 U/L (ref 1–33)
ANION GAP SERPL CALCULATED.3IONS-SCNC: 8 MMOL/L (ref 5–15)
AST SERPL-CCNC: 24 U/L (ref 1–32)
BASOPHILS # BLD AUTO: 0.04 10*3/MM3 (ref 0–0.2)
BASOPHILS NFR BLD AUTO: 0.8 % (ref 0–1.5)
BILIRUB SERPL-MCNC: 0.2 MG/DL (ref 0–1.2)
BUN SERPL-MCNC: 15 MG/DL (ref 6–20)
BUN/CREAT SERPL: 20 (ref 7–25)
CALCIUM SPEC-SCNC: 9 MG/DL (ref 8.6–10.5)
CHLORIDE SERPL-SCNC: 103 MMOL/L (ref 98–107)
CO2 SERPL-SCNC: 27 MMOL/L (ref 22–29)
CREAT SERPL-MCNC: 0.75 MG/DL (ref 0.57–1)
DEPRECATED RDW RBC AUTO: 43.8 FL (ref 37–54)
EGFRCR SERPLBLD CKD-EPI 2021: 95.9 ML/MIN/1.73
EOSINOPHIL # BLD AUTO: 0.39 10*3/MM3 (ref 0–0.4)
EOSINOPHIL NFR BLD AUTO: 7.3 % (ref 0.3–6.2)
ERYTHROCYTE [DISTWIDTH] IN BLOOD BY AUTOMATED COUNT: 13.5 % (ref 12.3–15.4)
FERRITIN SERPL-MCNC: 19.07 NG/ML (ref 13–150)
GLOBULIN UR ELPH-MCNC: 2.9 GM/DL
GLUCOSE SERPL-MCNC: 125 MG/DL (ref 65–99)
HCT VFR BLD AUTO: 39.1 % (ref 34–46.6)
HGB BLD-MCNC: 12 G/DL (ref 12–15.9)
IMM GRANULOCYTES # BLD AUTO: 0.02 10*3/MM3 (ref 0–0.05)
IMM GRANULOCYTES NFR BLD AUTO: 0.4 % (ref 0–0.5)
IRON 24H UR-MRATE: 39 MCG/DL (ref 37–145)
IRON SATN MFR SERPL: 9 % (ref 20–50)
LYMPHOCYTES # BLD AUTO: 1.2 10*3/MM3 (ref 0.7–3.1)
LYMPHOCYTES NFR BLD AUTO: 22.5 % (ref 19.6–45.3)
MCH RBC QN AUTO: 27.2 PG (ref 26.6–33)
MCHC RBC AUTO-ENTMCNC: 30.7 G/DL (ref 31.5–35.7)
MCV RBC AUTO: 88.7 FL (ref 79–97)
MONOCYTES # BLD AUTO: 0.28 10*3/MM3 (ref 0.1–0.9)
MONOCYTES NFR BLD AUTO: 5.3 % (ref 5–12)
NEUTROPHILS NFR BLD AUTO: 3.4 10*3/MM3 (ref 1.7–7)
NEUTROPHILS NFR BLD AUTO: 63.7 % (ref 42.7–76)
NRBC BLD AUTO-RTO: 0 /100 WBC (ref 0–0.2)
PLATELET # BLD AUTO: 204 10*3/MM3 (ref 140–450)
PMV BLD AUTO: 10.6 FL (ref 6–12)
POTASSIUM SERPL-SCNC: 3.3 MMOL/L (ref 3.5–5.2)
PROT SERPL-MCNC: 7 G/DL (ref 6–8.5)
RBC # BLD AUTO: 4.41 10*6/MM3 (ref 3.77–5.28)
SODIUM SERPL-SCNC: 138 MMOL/L (ref 136–145)
TIBC SERPL-MCNC: 441 MCG/DL (ref 298–536)
TRANSFERRIN SERPL-MCNC: 296 MG/DL (ref 200–360)
WBC NRBC COR # BLD: 5.33 10*3/MM3 (ref 3.4–10.8)

## 2023-01-25 PROCEDURE — 36415 COLL VENOUS BLD VENIPUNCTURE: CPT

## 2023-01-25 PROCEDURE — 85025 COMPLETE CBC W/AUTO DIFF WBC: CPT

## 2023-01-25 PROCEDURE — 80053 COMPREHEN METABOLIC PANEL: CPT

## 2023-01-25 PROCEDURE — 82728 ASSAY OF FERRITIN: CPT

## 2023-01-25 PROCEDURE — 84466 ASSAY OF TRANSFERRIN: CPT

## 2023-01-25 PROCEDURE — 83540 ASSAY OF IRON: CPT

## 2024-08-12 ENCOUNTER — OFFICE VISIT (OUTPATIENT)
Dept: OBSTETRICS AND GYNECOLOGY | Age: 54
End: 2024-08-12
Payer: COMMERCIAL

## 2024-08-12 VITALS
WEIGHT: 201 LBS | SYSTOLIC BLOOD PRESSURE: 142 MMHG | DIASTOLIC BLOOD PRESSURE: 94 MMHG | HEIGHT: 68 IN | BODY MASS INDEX: 30.46 KG/M2

## 2024-08-12 DIAGNOSIS — Z12.31 ENCOUNTER FOR SCREENING MAMMOGRAM FOR BREAST CANCER: ICD-10-CM

## 2024-08-12 DIAGNOSIS — N93.8 DUB (DYSFUNCTIONAL UTERINE BLEEDING): ICD-10-CM

## 2024-08-12 DIAGNOSIS — Z01.419 WELL WOMAN EXAM WITH ROUTINE GYNECOLOGICAL EXAM: Primary | ICD-10-CM

## 2024-08-12 DIAGNOSIS — Z12.4 ENCOUNTER FOR SCREENING FOR CERVICAL CANCER: ICD-10-CM

## 2024-08-12 PROCEDURE — 88305 TISSUE EXAM BY PATHOLOGIST: CPT | Performed by: NURSE PRACTITIONER

## 2024-08-12 PROCEDURE — 99459 PELVIC EXAMINATION: CPT | Performed by: NURSE PRACTITIONER

## 2024-08-12 PROCEDURE — 99396 PREV VISIT EST AGE 40-64: CPT | Performed by: NURSE PRACTITIONER

## 2024-08-12 PROCEDURE — 87624 HPV HI-RISK TYP POOLED RSLT: CPT | Performed by: NURSE PRACTITIONER

## 2024-08-12 PROCEDURE — 58100 BIOPSY OF UTERUS LINING: CPT | Performed by: NURSE PRACTITIONER

## 2024-08-12 PROCEDURE — G0123 SCREEN CERV/VAG THIN LAYER: HCPCS | Performed by: NURSE PRACTITIONER

## 2024-08-12 RX ORDER — TRAZODONE HYDROCHLORIDE 50 MG/1
TABLET ORAL
COMMUNITY
Start: 2024-07-29

## 2024-08-12 RX ORDER — SIMVASTATIN 40 MG
TABLET ORAL
COMMUNITY

## 2024-08-12 RX ORDER — BUSPIRONE HYDROCHLORIDE 15 MG/1
TABLET ORAL
COMMUNITY
Start: 2024-07-29

## 2024-08-12 RX ORDER — SERTRALINE HYDROCHLORIDE 100 MG/1
TABLET, FILM COATED ORAL
COMMUNITY
Start: 2024-07-29

## 2024-08-12 RX ORDER — TIZANIDINE 2 MG/1
TABLET ORAL
COMMUNITY

## 2024-08-12 NOTE — PROGRESS NOTES
An endometrial biopsy was performed in the office today.  The cervix was visualized with a speculum and prepped with Betadine.  The anterior lip was grasped with a tenaculum and a standard endometrial sampling Pipelle was passed into the uterine cavity.  The patient experienced mild cramping and the uterus sounded to 8 cm.  A small amount of tissue was obtained with 2 passes.  The tenaculum was removed and the site was hemostatic.  She tolerated the procedure well.

## 2024-08-12 NOTE — PROGRESS NOTES
"Chief Complaint   Patient presents with    Gynecologic Exam     Bernal transfer here for annual, last pap 8/7/23, mammogram 3/22/23, patient reports wanting a hyst. Patient reports periods come and go, she reports she may bleed for a day or bleed for 2 weeks. Patient reports not having a period in 2.5 months.         Subjective     Pilar Pope is a 54 y.o. female    History of Present Illness  Pt comes in today for annual wellness exam. Main complaint is that of periods that are irregular. Is wanting hysterectomy.     /94 (BP Location: Left arm, Patient Position: Sitting, Cuff Size: Adult)   Ht 172.7 cm (68\")   Wt 91.2 kg (201 lb)   LMP  (LMP Unknown)   BMI 30.56 kg/m²     Outpatient Encounter Medications as of 8/12/2024   Medication Sig Dispense Refill    busPIRone (BUSPAR) 15 MG tablet       sertraline (ZOLOFT) 100 MG tablet       traZODone (DESYREL) 50 MG tablet       hydroCHLOROthiazide (HYDRODIURIL) 25 MG tablet Take 25 mg by mouth Daily.      lisinopril (PRINIVIL,ZESTRIL) 40 MG tablet Take 40 mg by mouth Daily.      pantoprazole (PROTONIX) 20 MG EC tablet Take 20 mg by mouth Daily.      simvastatin (ZOCOR) 40 MG tablet       tiZANidine (ZANAFLEX) 2 MG tablet       [DISCONTINUED] busPIRone (BUSPAR) 10 MG tablet Take 10 mg by mouth 3 (Three) Times a Day.      [DISCONTINUED] sertraline (ZOLOFT) 25 MG tablet Take 25 mg by mouth Daily.       No facility-administered encounter medications on file as of 8/12/2024.       Past Medical History  Past Medical History:   Diagnosis Date    Anxiety June 16    GERD (gastroesophageal reflux disease)     High cholesterol     History of transfusion 2022    Hypertension 2002    Iron deficiency anemia, unspecified 08/05/2021    Malabsorption syndrome 08/05/2021    Migraine     Multiple gestation October 4,1991 May 10 2002 October 14,2005    Skin cancer 2010    Urogenital trichomoniasis 2023    Varicella         Surgical History  Past Surgical History:   Procedure " Laterality Date    APPENDECTOMY      CHOLECYSTECTOMY  2017    LAPAROSCOPIC CHOLECYSTECTOMY  2019    LYMPH NODE BIOPSY      POSTPARTUM TUBAL LIGATION      SINUS SURGERY      SKIN CANCER EXCISION      TUBAL ABDOMINAL LIGATION  2005    WISDOM TOOTH EXTRACTION         Family History  Family History   Problem Relation Age of Onset    Heart disease Father     Lung cancer Father     Diabetes Father     Hypertension Father        The following portions of the patient's history were reviewed and updated as appropriate: allergies, current medications, past family history, past medical history, past social history, past surgical history, and problem list.    Review of Systems   Constitutional:  Negative for activity change and unexpected weight loss.   Cardiovascular:  Negative for chest pain.   Gastrointestinal:  Negative for blood in stool, constipation and diarrhea.   Endocrine: Negative for cold intolerance and heat intolerance.   Genitourinary:  Positive for menstrual problem. Negative for dyspareunia, pelvic pain and vaginal discharge.   Musculoskeletal:  Negative for arthralgias, back pain, neck pain and neck stiffness.   Skin:  Negative for rash.   Neurological:  Negative for dizziness and headache.   Psychiatric/Behavioral:  Negative for sleep disturbance. The patient is not nervous/anxious.        Objective   Physical Exam  Vitals and nursing note reviewed. Exam conducted with a chaperone present.   Constitutional:       General: She is not in acute distress.     Appearance: She is well-developed. She is not diaphoretic.   HENT:      Head: Normocephalic.      Right Ear: External ear normal.      Left Ear: External ear normal.      Nose: Nose normal.   Eyes:      General: No scleral icterus.        Right eye: No discharge.         Left eye: No discharge.      Conjunctiva/sclera: Conjunctivae normal.      Pupils: Pupils are equal, round, and reactive to light.   Neck:      Thyroid: No thyromegaly.      Vascular: No  carotid bruit.      Trachea: No tracheal deviation.   Cardiovascular:      Rate and Rhythm: Normal rate and regular rhythm.      Heart sounds: Normal heart sounds. No murmur heard.  Pulmonary:      Effort: Pulmonary effort is normal. No respiratory distress.      Breath sounds: Normal breath sounds. No wheezing.   Chest:   Breasts:     Breasts are symmetrical.      Right: Normal. No swelling, bleeding, inverted nipple, mass, nipple discharge, skin change or tenderness.      Left: Normal. No swelling, bleeding, inverted nipple, mass, nipple discharge, skin change or tenderness.   Abdominal:      General: There is no distension.      Palpations: Abdomen is soft. There is no mass.      Tenderness: There is no abdominal tenderness. There is no right CVA tenderness, left CVA tenderness or guarding.      Hernia: No hernia is present. There is no hernia in the left inguinal area or right inguinal area.   Genitourinary:     General: Normal vulva.      Exam position: Lithotomy position.      Labia:         Right: No rash, tenderness, lesion or injury.         Left: No rash, tenderness, lesion or injury.       Vagina: Normal. No signs of injury and foreign body. No vaginal discharge, erythema, tenderness or bleeding.      Cervix: Normal.      Uterus: Normal. Not enlarged, not fixed and not tender.       Adnexa: Right adnexa normal and left adnexa normal.        Right: No mass, tenderness or fullness.          Left: No mass, tenderness or fullness.        Rectum: Normal. No mass.      Comments:   BSU normal  Urethral meatus  Normal  Perineum  Normal  Musculoskeletal:         General: No tenderness. Normal range of motion.      Cervical back: Normal range of motion and neck supple.   Lymphadenopathy:      Head:      Right side of head: No submental, submandibular, tonsillar, preauricular, posterior auricular or occipital adenopathy.      Left side of head: No submental, submandibular, tonsillar, preauricular, posterior  auricular or occipital adenopathy.      Cervical: No cervical adenopathy.      Right cervical: No superficial, deep or posterior cervical adenopathy.     Left cervical: No superficial, deep or posterior cervical adenopathy.      Upper Body:      Right upper body: No supraclavicular, axillary or pectoral adenopathy.      Left upper body: No supraclavicular, axillary or pectoral adenopathy.      Lower Body: No right inguinal adenopathy. No left inguinal adenopathy.   Skin:     General: Skin is warm and dry.      Findings: No bruising, erythema or rash.   Neurological:      Mental Status: She is alert and oriented to person, place, and time.      Coordination: Coordination normal.   Psychiatric:         Mood and Affect: Mood normal.         Behavior: Behavior normal.         Thought Content: Thought content normal.         Judgment: Judgment normal.         Assessment & Plan   Diagnoses and all orders for this visit:    1. Well woman exam with routine gynecological exam (Primary)    2. Encounter for screening mammogram for breast cancer  -     Mammo screening digital tomosynthesis bilateral w CAD; Future    3. DUB (dysfunctional uterine bleeding)  -     Estradiol  -     Follicle Stimulating Hormone  -     Luteinizing Hormone  -     Progesterone  -     Testosterone  -     TSH; Future  -     Pregnancy, Urine - Urine, Clean Catch  -     Tissue Pathology Exam    4. Encounter for screening for cervical cancer  -     Liquid-based Pap Smear, Screening         Normal GYN exam. Encouraged SBE, pt is aware how to do self breast exam and the importance of same. Discussed weight management and importance of maintaining a healthy weight. Discussed Vitamin D intake and the importance of adequate vitamin D for both bone health and a healthy immune system.  Discussed daily exercise and the importance of same, in regards to a healthy heart as well as helping to maintain her weight and improving her mental health.  Colonoscopy is up to  date. Mammogram will be scheduled. Bone density is not indicated. Pap smear is done per pt request. ASCCP guidelines discussed. HPV is done. Lab work up is up to date through PCP.    Pt complains of irregular bleeding. She is wanting hysterectomy.     Discussed endometrial biopsy which pt agreed with proceeding with today. Will bring back for ultrasound and appt with MD to further discuss. Will also check labs for menopausal state.    BMI is >= 30 and <35. (Class 1 Obesity). The following options were offered after discussion;: weight loss educational material (shared in after visit summary)      GLO Turner  8/12/2024    Return for ultrasound and OV with MD to discuss hysterectomy.

## 2024-08-13 LAB
ESTRADIOL SERPL-MCNC: 24.4 PG/ML
FSH SERPL-ACNC: 63.9 MIU/ML
GEN CATEG CVX/VAG CYTO-IMP: NORMAL
HCG UR QL: NEGATIVE
HPV I/H RISK 4 DNA CVX QL PROBE+SIG AMP: NOT DETECTED
LAB AP CASE REPORT: NORMAL
LAB AP GYN ADDITIONAL INFORMATION: NORMAL
LH SERPL-ACNC: 59.3 MIU/ML
Lab: NORMAL
PATH INTERP SPEC-IMP: NORMAL
PROGEST SERPL-MCNC: <0.1 NG/ML
STAT OF ADQ CVX/VAG CYTO-IMP: NORMAL
TESTOST SERPL-MCNC: 48 NG/DL (ref 4–50)

## 2024-08-14 LAB
CYTO UR: NORMAL
LAB AP CASE REPORT: NORMAL
LAB AP CLINICAL INFORMATION: NORMAL
Lab: NORMAL
PATH REPORT.FINAL DX SPEC: NORMAL
PATH REPORT.GROSS SPEC: NORMAL

## 2024-08-18 LAB
NCCN CRITERIA FLAG: NORMAL
TYRER CUZICK SCORE: 7.3

## 2024-08-21 ENCOUNTER — OFFICE VISIT (OUTPATIENT)
Age: 54
End: 2024-08-21
Payer: COMMERCIAL

## 2024-08-21 VITALS
BODY MASS INDEX: 30.62 KG/M2 | DIASTOLIC BLOOD PRESSURE: 86 MMHG | WEIGHT: 202 LBS | SYSTOLIC BLOOD PRESSURE: 128 MMHG | HEIGHT: 68 IN

## 2024-08-21 DIAGNOSIS — Z78.9 NON-SMOKER: ICD-10-CM

## 2024-08-21 DIAGNOSIS — N93.8 DUB (DYSFUNCTIONAL UTERINE BLEEDING): Primary | ICD-10-CM

## 2024-08-21 DIAGNOSIS — D25.9 UTERINE LEIOMYOMA, UNSPECIFIED LOCATION: ICD-10-CM

## 2024-08-21 PROCEDURE — 99214 OFFICE O/P EST MOD 30 MIN: CPT | Performed by: OBSTETRICS & GYNECOLOGY

## 2024-08-21 RX ORDER — ACETAMINOPHEN 500 MG
1000 TABLET ORAL ONCE
OUTPATIENT
Start: 2024-08-21 | End: 2024-08-21

## 2024-08-21 RX ORDER — SODIUM CHLORIDE, SODIUM LACTATE, POTASSIUM CHLORIDE, CALCIUM CHLORIDE 600; 310; 30; 20 MG/100ML; MG/100ML; MG/100ML; MG/100ML
125 INJECTION, SOLUTION INTRAVENOUS CONTINUOUS
OUTPATIENT
Start: 2024-08-21

## 2024-08-21 RX ORDER — SODIUM CHLORIDE 0.9 % (FLUSH) 0.9 %
10 SYRINGE (ML) INJECTION EVERY 12 HOURS SCHEDULED
OUTPATIENT
Start: 2024-08-21

## 2024-08-21 RX ORDER — GABAPENTIN 100 MG/1
600 CAPSULE ORAL ONCE
OUTPATIENT
Start: 2024-08-21 | End: 2024-08-21

## 2024-08-21 RX ORDER — SODIUM CHLORIDE 0.9 % (FLUSH) 0.9 %
10 SYRINGE (ML) INJECTION AS NEEDED
OUTPATIENT
Start: 2024-08-21

## 2024-08-21 RX ORDER — PHENAZOPYRIDINE HYDROCHLORIDE 100 MG/1
200 TABLET, FILM COATED ORAL ONCE
OUTPATIENT
Start: 2024-08-21 | End: 2024-08-21

## 2024-08-21 RX ORDER — SCOLOPAMINE TRANSDERMAL SYSTEM 1 MG/1
1 PATCH, EXTENDED RELEASE TRANSDERMAL CONTINUOUS
OUTPATIENT
Start: 2024-08-21 | End: 2024-08-24

## 2024-08-21 RX ORDER — SODIUM CHLORIDE 9 MG/ML
40 INJECTION, SOLUTION INTRAVENOUS AS NEEDED
OUTPATIENT
Start: 2024-08-21

## 2024-08-21 NOTE — H&P
Alban Valdes MD  [facility]  [address]  [place] [address]  Office [phone]  Fax [phone]      [facility]  Pilar Pope  1970  [id number]  [id number]  2024    Subjective   Pilar Pope is a 54 y.o. year old female  who presents for surgery due to DUB, Chronic Pelvic Pain, Menorrhagia, Dysmenorrhea, and Uterine Fibroids.  Failed conservative measures include NSAIDS and OCPs.    COEMIG Procedure yes  Rating of Pain 5  Effect on daily activities significant    HPI    Risks, benefits, and alternatives of a hysterectomy were discussed with the patient in detail. Intraoperative risks of bleeding and damage to surrounding organs, including but not limited to intestine, bladder and ureter, were explained. Management of these were also explained. Postoperative complications such as infection, pneumonia, DVT, and bleeding were explained. Cuff dehiscence and cuff hematoma/cellulitis were also explained. The importance of compliance with postoperative restrictions was discussed. Long term effect regarding pelvic organ prolapse was also explained. Laparoscopic approach with use of the da Len robotic system was explained. Indications for conversion to a laparotomy were discussed.     Ovarian conservation/removal was also discussed. Menopausal symptoms associated with adnexectomy were explained in detail. Reduction in the risk of ovarian cancer was discussed. Unknown benefits of ovarian conservation were also discussed.     Removal of fallopian tubes regardless of ovarian removal was discussed and patient verbalized understanding.    All of the patient's questions were answered to her satisfaction. She was encouraged to return for an additional appointment if she had further questions. She verbalized understanding of the above and wished to proceed with the outlined plan.    Desires bilateral oophorectomy        Past Medical History:   Diagnosis Date     Anxiety June 16    Going to see the dr this week    GERD (gastroesophageal reflux disease)     High cholesterol     History of transfusion 2022    Low blood due to heavy periods    Hypertension 2002    Iron deficiency anemia, unspecified 08/05/2021    Malabsorption syndrome 08/05/2021    Migraine     Multiple gestation October 4,1991 May 10 2002 October 14,2005    Skin cancer 2010    Urogenital trichomoniasis 2023    Varicella        Past Surgical History:   Procedure Laterality Date    APPENDECTOMY      CHOLECYSTECTOMY  2017    LAPAROSCOPIC CHOLECYSTECTOMY  2019    LYMPH NODE BIOPSY      POSTPARTUM TUBAL LIGATION      SINUS SURGERY      SKIN CANCER EXCISION      TUBAL ABDOMINAL LIGATION  2005    WISDOM TOOTH EXTRACTION           Current Outpatient Medications:     busPIRone (BUSPAR) 15 MG tablet, , Disp: , Rfl:     hydroCHLOROthiazide (HYDRODIURIL) 25 MG tablet, Take 1 tablet by mouth Daily., Disp: , Rfl:     lisinopril (PRINIVIL,ZESTRIL) 40 MG tablet, Take 1 tablet by mouth Daily., Disp: , Rfl:     pantoprazole (PROTONIX) 20 MG EC tablet, Take 1 tablet by mouth Daily., Disp: , Rfl:     sertraline (ZOLOFT) 100 MG tablet, , Disp: , Rfl:     simvastatin (ZOCOR) 40 MG tablet, , Disp: , Rfl:     tiZANidine (ZANAFLEX) 2 MG tablet, , Disp: , Rfl:     traZODone (DESYREL) 50 MG tablet, , Disp: , Rfl:     Allergies   Allergen Reactions    Neosporin [Bacitracin-Polymyxin B] Hives       Family History   Problem Relation Age of Onset    Heart disease Father     Lung cancer Father     Diabetes Father     Hypertension Father        Social History     Socioeconomic History    Marital status:    Tobacco Use    Smoking status: Never    Smokeless tobacco: Never   Vaping Use    Vaping status: Never Used   Substance and Sexual Activity    Alcohol use: Yes     Alcohol/week: 2.0 standard drinks of alcohol     Types: 2 Drinks containing 0.5 oz of alcohol per week     Comment: Once or twice a month    Drug use: Never    Sexual  activity: Not Currently     Partners: Male     Birth control/protection: Tubal ligation       OB History    Para Term  AB Living   3 3 2 1 0 3   SAB IAB Ectopic Molar Multiple Live Births   0 0 0 0 0 3      # Outcome Date GA Lbr Keyur/2nd Weight Sex Type Anes PTL Lv   3 Term 10/14/05 39w0d  3685 g (8 lb 2 oz) F Vag-Spont   KRYSTYNA   2  05/10/02 36w0d  2438 g (5 lb 6 oz) F Vag-Spont   KRYSTYNA   1 Term 10/04/91 42w0d  2920 g (6 lb 7 oz) M Vag-Spont   KRYSTYNA       Review of Systems   Genitourinary:  Positive for menstrual problem, pelvic pain and vaginal bleeding.   All other systems reviewed and are negative.         Objective   Vitals:    24 1348   BP: 128/86       Physical Exam  Constitutional:       General: She is not in acute distress.     Appearance: Normal appearance. She is not toxic-appearing.   HENT:      Head: Normocephalic and atraumatic.      Nose: Nose normal.   Neurological:      General: No focal deficit present.      Mental Status: She is alert.   Psychiatric:         Mood and Affect: Mood normal.         Behavior: Behavior normal.         Thought Content: Thought content normal.         Judgment: Judgment normal.            Assessment & Plan   Assessment/Plan:  Diagnoses and all orders for this visit:    1. DUB (dysfunctional uterine bleeding) (Primary)  -     Case Request    2. Uterine leiomyoma, unspecified location  -     Case Request    3. Non-smoker        The risks, benefits, and alternatives of the procedure; along with the risks of anesthesia was discussed in full with the patient and all questions were answered.    TOTAL LAPAROSCOPIC HYSTERECTOMY BILATERAL SALPINGOOPHORECTOMY WITH DAVINCI ROBOT     Alban Valdes MD

## 2024-08-21 NOTE — PROGRESS NOTES
Chief Complaint  Menstrual Problem (Pt here for f/u on DUB, had EMB that was normal but labs suggested she is not menopausal, she had u/s done in office today, she reports her periods are irregular and may bleeding for a day or two weeks and has worsened over the last 2.5 yrs, she has previously been on birth control 10yrs ago with no improvement but has not had any other tx, she is s/p tubal ligation 2006, last pap 08/12/2024 negative cotesting)    Subjective        Pilar Geen Niko presents to [facility]  History of Present Illness    Patient presents today to discuss dysfunctional uterine bleeding  He can be heavy, unpredictable, and painful at times  Ultrasound is ordered and is reviewed  Multiple fibroids are noted  She desires definitive therapy    She has tried oral contraceptives in the past without improvement    Risks, benefits, and alternatives of a hysterectomy were discussed with the patient in detail. Intraoperative risks of bleeding and damage to surrounding organs, including but not limited to intestine, bladder and ureter, were explained. Management of these were also explained. Postoperative complications such as infection, pneumonia, DVT, and bleeding were explained. Cuff dehiscence and cuff hematoma/cellulitis were also explained. The importance of compliance with postoperative restrictions was discussed. Long term effect regarding pelvic organ prolapse was also explained. Laparoscopic approach with use of the da Len robotic system was explained. Indications for conversion to a laparotomy were discussed.     Ovarian conservation/removal was also discussed. Menopausal symptoms associated with adnexectomy were explained in detail. Reduction in the risk of ovarian cancer was discussed. Unknown benefits of ovarian conservation were also discussed.     Removal of fallopian tubes regardless of ovarian removal was discussed and patient verbalized understanding.    All of the  "patient's questions were answered to her satisfaction. She was encouraged to return for an additional appointment if she had further questions. She verbalized understanding of the above and wished to proceed with the outlined plan.      Patient desires bilateral oophorectomy    Objective   Vital Signs:  /86 (BP Location: Right arm, Patient Position: Sitting, Cuff Size: Large Adult)   Ht 172.7 cm (68\")   Wt 91.6 kg (202 lb)   BMI 30.71 kg/m²   Estimated body mass index is 30.71 kg/m² as calculated from the following:    Height as of this encounter: 172.7 cm (68\").    Weight as of this encounter: 91.6 kg (202 lb).            Physical Exam  Constitutional:       General: She is not in acute distress.     Appearance: Normal appearance. She is not toxic-appearing.   HENT:      Head: Normocephalic and atraumatic.      Nose: Nose normal.   Neurological:      General: No focal deficit present.      Mental Status: She is alert.   Psychiatric:         Mood and Affect: Mood normal.         Behavior: Behavior normal.         Thought Content: Thought content normal.         Judgment: Judgment normal.        Result Review :                Assessment and Plan   Diagnoses and all orders for this visit:    1. DUB (dysfunctional uterine bleeding) (Primary)  -     Case Request    2. Uterine leiomyoma, unspecified location  -     Case Request    3. Non-smoker             Follow Up   No follow-ups on file.  Patient was given instructions and counseling regarding her condition or for health maintenance advice. Please see specific information pulled into the AVS if appropriate.     TOTAL LAPAROSCOPIC HYSTERECTOMY BILATERAL SALPINGOOPHORECTOMY WITH DAVINCI ROBOT       Alban Valdes MD    "

## 2024-08-23 ENCOUNTER — HOSPITAL ENCOUNTER (OUTPATIENT)
Dept: MAMMOGRAPHY | Facility: HOSPITAL | Age: 54
Discharge: HOME | End: 2024-08-23
Admitting: NURSE PRACTITIONER
Payer: COMMERCIAL

## 2024-08-23 DIAGNOSIS — Z12.31 ENCOUNTER FOR SCREENING MAMMOGRAM FOR BREAST CANCER: ICD-10-CM

## 2024-08-23 PROCEDURE — 77063 BREAST TOMOSYNTHESIS BI: CPT

## 2024-08-23 PROCEDURE — 77067 SCR MAMMO BI INCL CAD: CPT

## 2024-09-06 ENCOUNTER — PRE-ADMISSION TESTING (OUTPATIENT)
Dept: PREADMISSION TESTING | Facility: HOSPITAL | Age: 54
End: 2024-09-06
Payer: COMMERCIAL

## 2024-09-06 VITALS
SYSTOLIC BLOOD PRESSURE: 124 MMHG | DIASTOLIC BLOOD PRESSURE: 89 MMHG | HEART RATE: 111 BPM | WEIGHT: 205.03 LBS | HEIGHT: 67 IN | OXYGEN SATURATION: 96 % | BODY MASS INDEX: 32.18 KG/M2 | RESPIRATION RATE: 16 BRPM

## 2024-09-06 LAB
ANION GAP SERPL CALCULATED.3IONS-SCNC: 11 MMOL/L (ref 5–15)
BUN SERPL-MCNC: 14 MG/DL (ref 6–20)
BUN/CREAT SERPL: 16.7 (ref 7–25)
CALCIUM SPEC-SCNC: 9.3 MG/DL (ref 8.6–10.5)
CHLORIDE SERPL-SCNC: 104 MMOL/L (ref 98–107)
CO2 SERPL-SCNC: 28 MMOL/L (ref 22–29)
CREAT SERPL-MCNC: 0.84 MG/DL (ref 0.57–1)
DEPRECATED RDW RBC AUTO: 44.9 FL (ref 37–54)
EGFRCR SERPLBLD CKD-EPI 2021: 82.7 ML/MIN/1.73
ERYTHROCYTE [DISTWIDTH] IN BLOOD BY AUTOMATED COUNT: 14.1 % (ref 12.3–15.4)
GLUCOSE SERPL-MCNC: 119 MG/DL (ref 65–99)
HCT VFR BLD AUTO: 39 % (ref 34–46.6)
HGB BLD-MCNC: 12.3 G/DL (ref 12–15.9)
MCH RBC QN AUTO: 27.5 PG (ref 26.6–33)
MCHC RBC AUTO-ENTMCNC: 31.5 G/DL (ref 31.5–35.7)
MCV RBC AUTO: 87.2 FL (ref 79–97)
PLATELET # BLD AUTO: 226 10*3/MM3 (ref 140–450)
PMV BLD AUTO: 11.3 FL (ref 6–12)
POTASSIUM SERPL-SCNC: 3.6 MMOL/L (ref 3.5–5.2)
RBC # BLD AUTO: 4.47 10*6/MM3 (ref 3.77–5.28)
SODIUM SERPL-SCNC: 143 MMOL/L (ref 136–145)
WBC NRBC COR # BLD AUTO: 5.8 10*3/MM3 (ref 3.4–10.8)

## 2024-09-06 PROCEDURE — 36415 COLL VENOUS BLD VENIPUNCTURE: CPT

## 2024-09-06 PROCEDURE — 80048 BASIC METABOLIC PNL TOTAL CA: CPT

## 2024-09-06 PROCEDURE — 85027 COMPLETE CBC AUTOMATED: CPT

## 2024-09-06 NOTE — DISCHARGE INSTRUCTIONS
Preparing for Surgery  Follow these instructions before the procedure:  Several days or weeks before your procedure    Ask your health care provider about:  Changing or stopping your regular medicines. This is especially important if you are taking diabetes medicines or blood thinners.  Taking medicines such as aspirin and ibuprofen. These medicines can thin your blood. Do not take these medicines unless your health care provider tells you to take them.  Taking over-the-counter medicines, vitamins, herbs, and supplements.    Contact your surgeon if you:  Develop a fever of more than 100.4°F (38°C) or other feelings of illness during the 48 hours before your surgery.  Have symptoms that get worse.  Have questions or concerns about your surgery.  If you are going home the same day of your surgery you will need to arrange for a responsible adult, age 18 years old or older, to drive you home from the hospital and stay with you for 24 hours. Verification of the  will be made prior to any procedure requiring sedation. You may not go home in a taxi or any form of public transportation by yourself.     Day before your procedure  Medication(s) you need to stop the day before your surgery: LISINOPRIL    24 hours before your procedure DO NOT drink alcoholic beverages or smoke.  24 hours before your procedure STOP taking Erectile Dysfunction medication (i.e.,Cialis, Viagra)   You may be asked to shower with a germ-killing soap.  Day of your procedure   You may take the following medication(s) the morning of surgery with a sip of water: BUSPAR, PROTONIX      8 hours before your scheduled arrival time, STOP all food, any dairy products, and full liquids. This includes hard candy, chewing gum or mints. This is extremely important to prevent serious complications.     Up to 2 hours before your scheduled arrival time, you may have clear liquids no cream, powder, or pulp of any kind. Safe options are water, black coffee, plain  tea, soda, Gatorade/Powerade, clear broth, apple juice.    2 hours before your scheduled arrival time, STOP drinking clear liquids.    You may need to take another shower with a germ-killing soap before you leave home in the morning. Do not use perfumes, colognes, or body lotions.  Wear comfortable loose-fitting clothing.  Remove all jewelry including body piercing and rings, dark colored nail polish, and make up prior to arrival at the hospital. Leave all valuables at home.   Bring your hearing aids if you rely on them.  Do not wear contact lenses. If you wear eyeglasses remember to bring a case to store them in while you are in surgery.  Do not use denture adhesives since you will be asked to remove them during your surgery.    You do not need to bring your home medications into the hospital.   Bring your sleep apnea device with you on the day of your surgery (if this applies to you).  If you wear portable oxygen, bring it with you.   If you are staying overnight, you may bring a bag of items you may need such as slippers, robe and a change of clothes for your discharge. You may want to leave these items in the car until you are ready for them since your family will take your belongings when you leave the pre-operative area.  Arrive at the hospital as scheduled by the office. You will be asked to arrive 2 hours prior to your surgery time in order to prepare for your procedure.  When you arrive at the hospital  Go to the registration desk located at the main entrance of the hospital.  After registration is completed, you will be given a beeper and a sticker sheet. Take the stickers to Outpatient Surgery and place in the tray at the end of the desk to notify the staff that you have arrived and registered.   Return to the lobby to wait. You are not always called back according to the time of arrival but rather the time your doctor will be ready.  When your beeper lights up and vibrates proceed through the double  doors, under the stairs, and a member of the Outpatient Surgery staff will escort you to your preoperative room.             How to Use Chlorhexidine Before Surgery  Chlorhexidine gluconate (CHG) is a germ-killing (antiseptic) solution that is used to clean the skin. It can get rid of the bacteria that normally live on the skin and can keep them away for about 24 hours. To clean your skin with CHG, you may be given:  A CHG solution to use in the shower or as part of a sponge bath.  A prepackaged cloth that contains CHG.  Cleaning your skin with CHG may help lower the risk for infection:  While you are staying in the intensive care unit of the hospital.  If you have a vascular access, such as a central line, to provide short-term or long-term access to your veins.  If you have a catheter to drain urine from your bladder.  If you are on a ventilator. A ventilator is a machine that helps you breathe by moving air in and out of your lungs.  After surgery.  What are the risks?  Risks of using CHG include:  A skin reaction.  Hearing loss, if CHG gets in your ears and you have a perforated eardrum.  Eye injury, if CHG gets in your eyes and is not rinsed out.  The CHG product catching fire.  Make sure that you avoid smoking and flames after applying CHG to your skin.  Do not use CHG:  If you have a chlorhexidine allergy or have previously reacted to chlorhexidine.  On babies younger than 2 months of age.  How to use CHG solution  Use CHG only as told by your health care provider, and follow the instructions on the label.  Use the full amount of CHG as directed. Usually, this is one bottle.  During a shower    Follow these steps when using CHG solution during a shower (unless your health care provider gives you different instructions):  Start the shower.  Use your normal soap and shampoo to wash your face and hair.  Turn off the shower or move out of the shower stream.  Pour the CHG onto a clean washcloth. Do not use any  type of brush or rough-edged sponge.  Starting at your neck, lather your body down to your toes. Make sure you follow these instructions:  If you will be having surgery, pay special attention to the part of your body where you will be having surgery. Scrub this area for at least 1 minute.  Do not use CHG on your head or face. If the solution gets into your ears or eyes, rinse them well with water.  Avoid your genital area.  Avoid any areas of skin that have broken skin, cuts, or scrapes.  Scrub your back and under your arms. Make sure to wash skin folds.  Let the lather sit on your skin for 1-2 minutes or as long as told by your health care provider.  Thoroughly rinse your entire body in the shower. Make sure that all body creases and crevices are rinsed well.  Dry off with a clean towel. Do not put any substances on your body afterward--such as powder, lotion, or perfume--unless you are told to do so by your health care provider. Only use lotions that are recommended by the .  Put on clean clothes or pajamas.  If it is the night before your surgery, sleep in clean sheets.     During a sponge bath  Follow these steps when using CHG solution during a sponge bath (unless your health care provider gives you different instructions):  Use your normal soap and shampoo to wash your face and hair.  Pour the CHG onto a clean washcloth.  Starting at your neck, lather your body down to your toes. Make sure you follow these instructions:  If you will be having surgery, pay special attention to the part of your body where you will be having surgery. Scrub this area for at least 1 minute.  Do not use CHG on your head or face. If the solution gets into your ears or eyes, rinse them well with water.  Avoid your genital area.  Avoid any areas of skin that have broken skin, cuts, or scrapes.  Scrub your back and under your arms. Make sure to wash skin folds.  Let the lather sit on your skin for 1-2 minutes or as long as  told by your health care provider.  Using a different clean, wet washcloth, thoroughly rinse your entire body. Make sure that all body creases and crevices are rinsed well.  Dry off with a clean towel. Do not put any substances on your body afterward--such as powder, lotion, or perfume--unless you are told to do so by your health care provider. Only use lotions that are recommended by the .  Put on clean clothes or pajamas.  If it is the night before your surgery, sleep in clean sheets.  How to use CHG prepackaged cloths  Only use CHG cloths as told by your health care provider, and follow the instructions on the label.  Use the CHG cloth on clean, dry skin.  Do not use the CHG cloth on your head or face unless your health care provider tells you to.  When washing with the CHG cloth:  Avoid your genital area.  Avoid any areas of skin that have broken skin, cuts, or scrapes.  Before surgery    Follow these steps when using a CHG cloth to clean before surgery (unless your health care provider gives you different instructions):  Using the CHG cloth, vigorously scrub the part of your body where you will be having surgery. Scrub using a back-and-forth motion for 3 minutes. The area on your body should be completely wet with CHG when you are done scrubbing.  Do not rinse. Discard the cloth and let the area air-dry. Do not put any substances on the area afterward, such as powder, lotion, or perfume.  Put on clean clothes or pajamas.  If it is the night before your surgery, sleep in clean sheets.     For general bathing  Follow these steps when using CHG cloths for general bathing (unless your health care provider gives you different instructions).  Use a separate CHG cloth for each area of your body. Make sure you wash between any folds of skin and between your fingers and toes. Wash your body in the following order, switching to a new cloth after each step:  The front of your neck, shoulders, and chest.  Both  of your arms, under your arms, and your hands.  Your stomach and groin area, avoiding the genitals.  Your right leg and foot.  Your left leg and foot.  The back of your neck, your back, and your buttocks.  Do not rinse. Discard the cloth and let the area air-dry. Do not put any substances on your body afterward--such as powder, lotion, or perfume--unless you are told to do so by your health care provider. Only use lotions that are recommended by the .  Put on clean clothes or pajamas.  Contact a health care provider if:  Your skin gets irritated after scrubbing.  You have questions about using your solution or cloth.  You swallow any chlorhexidine. Call your local poison control center (1-182.728.1982 in the [place]  Get help right away if:  Your eyes itch badly, or they become very red or swollen.  Your skin itches badly and is red or swollen.  Your hearing changes.  You have trouble seeing.  You have swelling or tingling in your mouth or throat.  You have trouble breathing.  These symptoms may represent a serious problem that is an emergency. Do not wait to see if the symptoms will go away. Get medical help right away. Call your local emergency services (179 in the [place] Do not drive yourself to the hospital.  Summary  Chlorhexidine gluconate (CHG) is a germ-killing (antiseptic) solution that is used to clean the skin. Cleaning your skin with CHG may help to lower your risk for infection.  You may be given CHG to use for bathing. It may be in a bottle or in a prepackaged cloth to use on your skin. Carefully follow your health care provider's instructions and the instructions on the product label.  Do not use CHG if you have a chlorhexidine allergy.  Contact your health care provider if your skin gets irritated after scrubbing.  This information is not intended to replace advice given to you by your health care provider. Make sure you discuss any questions you have with your health care  provider.  Document Revised: 04/17/2023 Document Reviewed: 02/28/2022  Elsevier Patient Education © 2023 [employer]

## 2024-09-20 ENCOUNTER — ANESTHESIA (OUTPATIENT)
Dept: PERIOP | Facility: HOSPITAL | Age: 54
End: 2024-09-20
Payer: COMMERCIAL

## 2024-09-20 ENCOUNTER — ANESTHESIA EVENT (OUTPATIENT)
Dept: PERIOP | Facility: HOSPITAL | Age: 54
End: 2024-09-20
Payer: COMMERCIAL

## 2024-09-20 ENCOUNTER — HOSPITAL ENCOUNTER (OUTPATIENT)
Facility: HOSPITAL | Age: 54
Setting detail: HOSPITAL OUTPATIENT SURGERY
Discharge: HOME | End: 2024-09-20
Attending: OBSTETRICS & GYNECOLOGY | Admitting: OBSTETRICS & GYNECOLOGY
Payer: COMMERCIAL

## 2024-09-20 VITALS
OXYGEN SATURATION: 93 % | TEMPERATURE: 97 F | RESPIRATION RATE: 16 BRPM | HEART RATE: 74 BPM | DIASTOLIC BLOOD PRESSURE: 68 MMHG | SYSTOLIC BLOOD PRESSURE: 115 MMHG

## 2024-09-20 DIAGNOSIS — D25.9 UTERINE LEIOMYOMA, UNSPECIFIED LOCATION: ICD-10-CM

## 2024-09-20 DIAGNOSIS — N93.8 DUB (DYSFUNCTIONAL UTERINE BLEEDING): ICD-10-CM

## 2024-09-20 DIAGNOSIS — G89.18 POST-OP PAIN: Primary | ICD-10-CM

## 2024-09-20 LAB
ABO GROUP BLD: NORMAL
B-HCG UR QL: NEGATIVE
BLD GP AB SCN SERPL QL: NEGATIVE
RH BLD: POSITIVE
T&S EXPIRATION DATE: NORMAL

## 2024-09-20 PROCEDURE — 25010000002 PROPOFOL 10 MG/ML EMULSION: Performed by: NURSE ANESTHETIST, CERTIFIED REGISTERED

## 2024-09-20 PROCEDURE — 86900 BLOOD TYPING SEROLOGIC ABO: CPT | Performed by: OBSTETRICS & GYNECOLOGY

## 2024-09-20 PROCEDURE — 88307 TISSUE EXAM BY PATHOLOGIST: CPT | Performed by: OBSTETRICS & GYNECOLOGY

## 2024-09-20 PROCEDURE — 86901 BLOOD TYPING SEROLOGIC RH(D): CPT | Performed by: OBSTETRICS & GYNECOLOGY

## 2024-09-20 PROCEDURE — 58571 TLH W/T/O 250 G OR LESS: CPT | Performed by: OBSTETRICS & GYNECOLOGY

## 2024-09-20 PROCEDURE — 25810000003 LACTATED RINGERS PER 1000 ML: Performed by: OBSTETRICS & GYNECOLOGY

## 2024-09-20 PROCEDURE — 25010000002 GLYCOPYRROLATE 0.4 MG/2ML SOLUTION: Performed by: NURSE ANESTHETIST, CERTIFIED REGISTERED

## 2024-09-20 PROCEDURE — 81025 URINE PREGNANCY TEST: CPT | Performed by: OBSTETRICS & GYNECOLOGY

## 2024-09-20 PROCEDURE — 25010000002 HYDROMORPHONE 1 MG/ML SOLUTION: Performed by: NURSE ANESTHETIST, CERTIFIED REGISTERED

## 2024-09-20 PROCEDURE — 25810000003 SODIUM CHLORIDE PER 500 ML: Performed by: OBSTETRICS & GYNECOLOGY

## 2024-09-20 PROCEDURE — 25010000002 DEXAMETHASONE PER 1 MG: Performed by: ANESTHESIOLOGY

## 2024-09-20 PROCEDURE — 25010000002 ONDANSETRON PER 1 MG: Performed by: NURSE ANESTHETIST, CERTIFIED REGISTERED

## 2024-09-20 PROCEDURE — 25010000002 FUROSEMIDE PER 20 MG: Performed by: NURSE ANESTHETIST, CERTIFIED REGISTERED

## 2024-09-20 PROCEDURE — 25010000002 CEFAZOLIN PER 500 MG: Performed by: OBSTETRICS & GYNECOLOGY

## 2024-09-20 PROCEDURE — 25010000002 FENTANYL CITRATE (PF) 100 MCG/2ML SOLUTION: Performed by: NURSE ANESTHETIST, CERTIFIED REGISTERED

## 2024-09-20 PROCEDURE — 88311 DECALCIFY TISSUE: CPT | Performed by: OBSTETRICS & GYNECOLOGY

## 2024-09-20 PROCEDURE — 25010000002 FENTANYL CITRATE (PF) 50 MCG/ML SOLUTION: Performed by: ANESTHESIOLOGY

## 2024-09-20 PROCEDURE — 25010000002 DEXAMETHASONE PER 1 MG: Performed by: NURSE ANESTHETIST, CERTIFIED REGISTERED

## 2024-09-20 PROCEDURE — 86850 RBC ANTIBODY SCREEN: CPT | Performed by: OBSTETRICS & GYNECOLOGY

## 2024-09-20 DEVICE — DEV CONTRL TISS STRATAFIX SPIRAL PDS PLUS SZ0 CT/2 30CM VIL: Type: IMPLANTABLE DEVICE | Site: ABDOMEN | Status: FUNCTIONAL

## 2024-09-20 RX ORDER — SODIUM CHLORIDE, SODIUM LACTATE, POTASSIUM CHLORIDE, CALCIUM CHLORIDE 600; 310; 30; 20 MG/100ML; MG/100ML; MG/100ML; MG/100ML
125 INJECTION, SOLUTION INTRAVENOUS CONTINUOUS
Status: DISCONTINUED | OUTPATIENT
Start: 2024-09-20 | End: 2024-09-20 | Stop reason: HOSPADM

## 2024-09-20 RX ORDER — SCOLOPAMINE TRANSDERMAL SYSTEM 1 MG/1
1 PATCH, EXTENDED RELEASE TRANSDERMAL ONCE
Status: DISCONTINUED | OUTPATIENT
Start: 2024-09-20 | End: 2024-09-20 | Stop reason: HOSPADM

## 2024-09-20 RX ORDER — FLUMAZENIL 0.1 MG/ML
0.2 INJECTION INTRAVENOUS AS NEEDED
Status: DISCONTINUED | OUTPATIENT
Start: 2024-09-20 | End: 2024-09-20 | Stop reason: HOSPADM

## 2024-09-20 RX ORDER — HYDROMORPHONE HYDROCHLORIDE 1 MG/ML
0.5 INJECTION, SOLUTION INTRAMUSCULAR; INTRAVENOUS; SUBCUTANEOUS
Status: DISCONTINUED | OUTPATIENT
Start: 2024-09-20 | End: 2024-09-20 | Stop reason: HOSPADM

## 2024-09-20 RX ORDER — DROPERIDOL 2.5 MG/ML
0.62 INJECTION, SOLUTION INTRAMUSCULAR; INTRAVENOUS ONCE AS NEEDED
Status: DISCONTINUED | OUTPATIENT
Start: 2024-09-20 | End: 2024-09-20 | Stop reason: HOSPADM

## 2024-09-20 RX ORDER — SODIUM CHLORIDE, SODIUM LACTATE, POTASSIUM CHLORIDE, CALCIUM CHLORIDE 600; 310; 30; 20 MG/100ML; MG/100ML; MG/100ML; MG/100ML
1000 INJECTION, SOLUTION INTRAVENOUS CONTINUOUS
Status: DISCONTINUED | OUTPATIENT
Start: 2024-09-20 | End: 2024-09-20 | Stop reason: HOSPADM

## 2024-09-20 RX ORDER — PROPOFOL 10 MG/ML
VIAL (ML) INTRAVENOUS AS NEEDED
Status: DISCONTINUED | OUTPATIENT
Start: 2024-09-20 | End: 2024-09-20 | Stop reason: SURG

## 2024-09-20 RX ORDER — GABAPENTIN 300 MG/1
600 CAPSULE ORAL ONCE
Status: COMPLETED | OUTPATIENT
Start: 2024-09-20 | End: 2024-09-20

## 2024-09-20 RX ORDER — SODIUM CHLORIDE 0.9 % (FLUSH) 0.9 %
10 SYRINGE (ML) INJECTION AS NEEDED
Status: DISCONTINUED | OUTPATIENT
Start: 2024-09-20 | End: 2024-09-20 | Stop reason: HOSPADM

## 2024-09-20 RX ORDER — IBUPROFEN 600 MG/1
600 TABLET, FILM COATED ORAL EVERY 6 HOURS PRN
Status: DISCONTINUED | OUTPATIENT
Start: 2024-09-20 | End: 2024-09-20 | Stop reason: HOSPADM

## 2024-09-20 RX ORDER — ONDANSETRON 2 MG/ML
4 INJECTION INTRAMUSCULAR; INTRAVENOUS
Status: DISCONTINUED | OUTPATIENT
Start: 2024-09-20 | End: 2024-09-20 | Stop reason: HOSPADM

## 2024-09-20 RX ORDER — MAGNESIUM HYDROXIDE 1200 MG/15ML
LIQUID ORAL AS NEEDED
Status: DISCONTINUED | OUTPATIENT
Start: 2024-09-20 | End: 2024-09-20 | Stop reason: HOSPADM

## 2024-09-20 RX ORDER — SODIUM CHLORIDE, SODIUM LACTATE, POTASSIUM CHLORIDE, CALCIUM CHLORIDE 600; 310; 30; 20 MG/100ML; MG/100ML; MG/100ML; MG/100ML
9 INJECTION, SOLUTION INTRAVENOUS CONTINUOUS
Status: DISCONTINUED | OUTPATIENT
Start: 2024-09-20 | End: 2024-09-20 | Stop reason: HOSPADM

## 2024-09-20 RX ORDER — MIDAZOLAM HYDROCHLORIDE 2 MG/2ML
1 INJECTION, SOLUTION INTRAMUSCULAR; INTRAVENOUS
Status: DISCONTINUED | OUTPATIENT
Start: 2024-09-20 | End: 2024-09-20 | Stop reason: HOSPADM

## 2024-09-20 RX ORDER — FENTANYL CITRATE 50 UG/ML
25 INJECTION, SOLUTION INTRAMUSCULAR; INTRAVENOUS
Status: DISCONTINUED | OUTPATIENT
Start: 2024-09-20 | End: 2024-09-20 | Stop reason: HOSPADM

## 2024-09-20 RX ORDER — ONDANSETRON 2 MG/ML
INJECTION INTRAMUSCULAR; INTRAVENOUS AS NEEDED
Status: DISCONTINUED | OUTPATIENT
Start: 2024-09-20 | End: 2024-09-20 | Stop reason: SURG

## 2024-09-20 RX ORDER — GLYCOPYRROLATE 0.2 MG/ML
INJECTION INTRAMUSCULAR; INTRAVENOUS AS NEEDED
Status: DISCONTINUED | OUTPATIENT
Start: 2024-09-20 | End: 2024-09-20 | Stop reason: SURG

## 2024-09-20 RX ORDER — OXYCODONE AND ACETAMINOPHEN 10; 325 MG/1; MG/1
1 TABLET ORAL EVERY 4 HOURS PRN
Status: DISCONTINUED | OUTPATIENT
Start: 2024-09-20 | End: 2024-09-20 | Stop reason: HOSPADM

## 2024-09-20 RX ORDER — FAMOTIDINE 10 MG/ML
20 INJECTION, SOLUTION INTRAVENOUS
Status: DISCONTINUED | OUTPATIENT
Start: 2024-09-20 | End: 2024-09-20 | Stop reason: HOSPADM

## 2024-09-20 RX ORDER — DEXTROSE MONOHYDRATE 25 G/50ML
12.5 INJECTION, SOLUTION INTRAVENOUS AS NEEDED
Status: DISCONTINUED | OUTPATIENT
Start: 2024-09-20 | End: 2024-09-20 | Stop reason: HOSPADM

## 2024-09-20 RX ORDER — OXYCODONE HYDROCHLORIDE 5 MG/1
5 TABLET ORAL EVERY 8 HOURS PRN
Qty: 6 TABLET | Refills: 0 | Status: SHIPPED | OUTPATIENT
Start: 2024-09-20 | End: 2024-09-23 | Stop reason: SDUPTHER

## 2024-09-20 RX ORDER — FENTANYL CITRATE 50 UG/ML
50 INJECTION, SOLUTION INTRAMUSCULAR; INTRAVENOUS
Status: DISCONTINUED | OUTPATIENT
Start: 2024-09-20 | End: 2024-09-20 | Stop reason: HOSPADM

## 2024-09-20 RX ORDER — SCOLOPAMINE TRANSDERMAL SYSTEM 1 MG/1
1 PATCH, EXTENDED RELEASE TRANSDERMAL CONTINUOUS
Status: DISCONTINUED | OUTPATIENT
Start: 2024-09-20 | End: 2024-09-20 | Stop reason: HOSPADM

## 2024-09-20 RX ORDER — NEOSTIGMINE METHYLSULFATE 5 MG/5 ML
SYRINGE (ML) INTRAVENOUS AS NEEDED
Status: DISCONTINUED | OUTPATIENT
Start: 2024-09-20 | End: 2024-09-20 | Stop reason: SURG

## 2024-09-20 RX ORDER — SODIUM CHLORIDE 0.9 % (FLUSH) 0.9 %
3 SYRINGE (ML) INJECTION AS NEEDED
Status: DISCONTINUED | OUTPATIENT
Start: 2024-09-20 | End: 2024-09-20 | Stop reason: HOSPADM

## 2024-09-20 RX ORDER — SODIUM CHLORIDE 0.9 % (FLUSH) 0.9 %
10 SYRINGE (ML) INJECTION EVERY 12 HOURS SCHEDULED
Status: DISCONTINUED | OUTPATIENT
Start: 2024-09-20 | End: 2024-09-20 | Stop reason: HOSPADM

## 2024-09-20 RX ORDER — SODIUM CHLORIDE 9 MG/ML
INJECTION, SOLUTION INTRAVENOUS AS NEEDED
Status: DISCONTINUED | OUTPATIENT
Start: 2024-09-20 | End: 2024-09-20 | Stop reason: HOSPADM

## 2024-09-20 RX ORDER — PROMETHAZINE HYDROCHLORIDE 25 MG/1
12.5 TABLET ORAL ONCE AS NEEDED
Status: DISCONTINUED | OUTPATIENT
Start: 2024-09-20 | End: 2024-09-20 | Stop reason: HOSPADM

## 2024-09-20 RX ORDER — PHENAZOPYRIDINE HYDROCHLORIDE 200 MG/1
200 TABLET, FILM COATED ORAL ONCE
Status: COMPLETED | OUTPATIENT
Start: 2024-09-20 | End: 2024-09-20

## 2024-09-20 RX ORDER — ACETAMINOPHEN 500 MG
1000 TABLET ORAL ONCE
Status: COMPLETED | OUTPATIENT
Start: 2024-09-20 | End: 2024-09-20

## 2024-09-20 RX ORDER — DEXAMETHASONE SODIUM PHOSPHATE 4 MG/ML
INJECTION, SOLUTION INTRA-ARTICULAR; INTRALESIONAL; INTRAMUSCULAR; INTRAVENOUS; SOFT TISSUE AS NEEDED
Status: DISCONTINUED | OUTPATIENT
Start: 2024-09-20 | End: 2024-09-20 | Stop reason: SURG

## 2024-09-20 RX ORDER — NALOXONE HCL 0.4 MG/ML
0.04 VIAL (ML) INJECTION AS NEEDED
Status: DISCONTINUED | OUTPATIENT
Start: 2024-09-20 | End: 2024-09-20 | Stop reason: HOSPADM

## 2024-09-20 RX ORDER — SODIUM CHLORIDE 9 MG/ML
40 INJECTION, SOLUTION INTRAVENOUS AS NEEDED
Status: DISCONTINUED | OUTPATIENT
Start: 2024-09-20 | End: 2024-09-20 | Stop reason: HOSPADM

## 2024-09-20 RX ORDER — FUROSEMIDE 10 MG/ML
INJECTION INTRAMUSCULAR; INTRAVENOUS AS NEEDED
Status: DISCONTINUED | OUTPATIENT
Start: 2024-09-20 | End: 2024-09-20 | Stop reason: SURG

## 2024-09-20 RX ORDER — LIDOCAINE HYDROCHLORIDE 10 MG/ML
0.5 INJECTION, SOLUTION EPIDURAL; INFILTRATION; INTRACAUDAL; PERINEURAL ONCE AS NEEDED
Status: DISCONTINUED | OUTPATIENT
Start: 2024-09-20 | End: 2024-09-20 | Stop reason: HOSPADM

## 2024-09-20 RX ORDER — ROCURONIUM BROMIDE 10 MG/ML
INJECTION, SOLUTION INTRAVENOUS AS NEEDED
Status: DISCONTINUED | OUTPATIENT
Start: 2024-09-20 | End: 2024-09-20 | Stop reason: SURG

## 2024-09-20 RX ORDER — FENTANYL CITRATE 50 UG/ML
INJECTION, SOLUTION INTRAMUSCULAR; INTRAVENOUS AS NEEDED
Status: DISCONTINUED | OUTPATIENT
Start: 2024-09-20 | End: 2024-09-20 | Stop reason: SURG

## 2024-09-20 RX ORDER — LABETALOL HYDROCHLORIDE 5 MG/ML
5 INJECTION, SOLUTION INTRAVENOUS
Status: DISCONTINUED | OUTPATIENT
Start: 2024-09-20 | End: 2024-09-20 | Stop reason: HOSPADM

## 2024-09-20 RX ORDER — DEXAMETHASONE SODIUM PHOSPHATE 4 MG/ML
4 INJECTION, SOLUTION INTRA-ARTICULAR; INTRALESIONAL; INTRAMUSCULAR; INTRAVENOUS; SOFT TISSUE ONCE AS NEEDED
Status: COMPLETED | OUTPATIENT
Start: 2024-09-20 | End: 2024-09-20

## 2024-09-20 RX ADMIN — FENTANYL CITRATE 50 MCG: 50 INJECTION, SOLUTION INTRAMUSCULAR; INTRAVENOUS at 08:52

## 2024-09-20 RX ADMIN — PROPOFOL 125 MG: 10 INJECTION, EMULSION INTRAVENOUS at 08:55

## 2024-09-20 RX ADMIN — Medication 25 MG: at 09:20

## 2024-09-20 RX ADMIN — CEFAZOLIN 2000 MG: 2 INJECTION, POWDER, FOR SOLUTION INTRAMUSCULAR; INTRAVENOUS at 09:02

## 2024-09-20 RX ADMIN — ACETAMINOPHEN 1000 MG: 500 TABLET, FILM COATED ORAL at 08:24

## 2024-09-20 RX ADMIN — Medication 3 MG: at 10:10

## 2024-09-20 RX ADMIN — ROCURONIUM BROMIDE 50 MG: 10 INJECTION, SOLUTION INTRAVENOUS at 08:55

## 2024-09-20 RX ADMIN — IBUPROFEN 600 MG: 600 TABLET, FILM COATED ORAL at 12:49

## 2024-09-20 RX ADMIN — DEXAMETHASONE SODIUM PHOSPHATE 4 MG: 4 INJECTION, SOLUTION INTRA-ARTICULAR; INTRALESIONAL; INTRAMUSCULAR; INTRAVENOUS; SOFT TISSUE at 08:47

## 2024-09-20 RX ADMIN — Medication 25 MG: at 08:54

## 2024-09-20 RX ADMIN — FENTANYL CITRATE 50 MCG: 50 INJECTION, SOLUTION INTRAMUSCULAR; INTRAVENOUS at 11:29

## 2024-09-20 RX ADMIN — SODIUM CHLORIDE, POTASSIUM CHLORIDE, SODIUM LACTATE AND CALCIUM CHLORIDE 1000 ML: 600; 310; 30; 20 INJECTION, SOLUTION INTRAVENOUS at 08:19

## 2024-09-20 RX ADMIN — ONDANSETRON 4 MG: 2 INJECTION INTRAMUSCULAR; INTRAVENOUS at 10:10

## 2024-09-20 RX ADMIN — OXYCODONE AND ACETAMINOPHEN 1 TABLET: 325; 10 TABLET ORAL at 11:24

## 2024-09-20 RX ADMIN — SODIUM CHLORIDE, POTASSIUM CHLORIDE, SODIUM LACTATE AND CALCIUM CHLORIDE 1000 ML: 600; 310; 30; 20 INJECTION, SOLUTION INTRAVENOUS at 08:13

## 2024-09-20 RX ADMIN — FENTANYL CITRATE 50 MCG: 50 INJECTION, SOLUTION INTRAMUSCULAR; INTRAVENOUS at 09:15

## 2024-09-20 RX ADMIN — FAMOTIDINE 20 MG: 10 INJECTION INTRAVENOUS at 08:47

## 2024-09-20 RX ADMIN — GABAPENTIN 600 MG: 300 CAPSULE ORAL at 08:24

## 2024-09-20 RX ADMIN — PHENAZOPYRIDINE HYDROCHLORIDE 200 MG: 200 TABLET ORAL at 08:24

## 2024-09-20 RX ADMIN — SCOPALAMINE 1 PATCH: 1 PATCH, EXTENDED RELEASE TRANSDERMAL at 08:42

## 2024-09-20 RX ADMIN — FUROSEMIDE 10 MG: 10 INJECTION, SOLUTION INTRAVENOUS at 10:00

## 2024-09-20 RX ADMIN — HYDROMORPHONE HYDROCHLORIDE 1 MG: 1 INJECTION, SOLUTION INTRAMUSCULAR; INTRAVENOUS; SUBCUTANEOUS at 10:22

## 2024-09-20 RX ADMIN — GLYCOPYRROLATE 0.4 MG: 0.2 INJECTION INTRAMUSCULAR; INTRAVENOUS at 10:10

## 2024-09-20 RX ADMIN — DEXAMETHASONE SODIUM PHOSPHATE 4 MG: 4 INJECTION, SOLUTION INTRA-ARTICULAR; INTRALESIONAL; INTRAMUSCULAR; INTRAVENOUS; SOFT TISSUE at 10:10

## 2024-09-23 DIAGNOSIS — G89.18 POST-OP PAIN: ICD-10-CM

## 2024-09-23 LAB
CYTO UR: NORMAL
LAB AP CASE REPORT: NORMAL
Lab: NORMAL
PATH REPORT.FINAL DX SPEC: NORMAL
PATH REPORT.GROSS SPEC: NORMAL

## 2024-09-24 RX ORDER — OXYCODONE HYDROCHLORIDE 5 MG/1
5 TABLET ORAL EVERY 8 HOURS PRN
Qty: 6 TABLET | Refills: 0 | Status: SHIPPED | OUTPATIENT
Start: 2024-09-24

## 2024-10-21 ENCOUNTER — TELEMEDICINE (OUTPATIENT)
Age: 54
End: 2024-10-21
Payer: COMMERCIAL

## 2024-10-21 DIAGNOSIS — Z78.9 NON-SMOKER: ICD-10-CM

## 2024-10-21 DIAGNOSIS — Z09 POSTOP CHECK: Primary | ICD-10-CM

## 2024-10-21 NOTE — PROGRESS NOTES
POSTOPERATIVE VIDEO VISIT      HPI  Pilar Pope presents for postoperative video visit. The visit from a scheduled appointment was initiated with an audio and visual connection by the patient. The advantages and limitations of video visits were discussed and understood by the patient. She is s/p TOTAL LAPAROSCOPIC HYSTERECTOMY BILATERAL SALPINGOOPHORECTOMY WITH DAVINCI ROBOT . The patient has had a relatively normal postoperative course.  The patient has had no current complaints. The patient has had improving normal postoperative pain.  The patient has had no issues with the wound.     Op Note by Alban Valdes MD (09/20/2024 09:09)   Tissue Pathology Exam (09/20/2024 09:28)     Patient has been compliant with postoperative restrictions  She is without complaints    Review of Systems     Past History:  The following portions of the patient's history were reviewed and updated as appropriate: allergies, current medications, past family history, past medical history, past social history, past surgical history, and problem list.      Physical Exam   Constitutional: She appears well-developed and well-nourished.   HENT:   Head: Normocephalic and atraumatic.   Neurological: She is alert.   Psychiatric: She has a normal mood and affect.        Assessment & Plan   Assessment:    1. Postop check    2. Non-smoker        Plan:   Continue postoperative care as instructed        Return in about 1 month (around 11/21/2024) for with me.     Alban Valdes MD  10/21/2024    Mode of Visit: Video  Location of patient: other: Work  Location of provider: [facility]  You have chosen to receive care through a telehealth visit.  The patient has signed the video visit consent form.  The visit included audio and video interaction. No technical issues occurred during this visit.    Continue pelvic rest  Discussed estrogen replacement therapy due to night sweats at next appointment  Call for concerns or questions    Alban GRISSOM  MD Lucio

## 2024-11-20 ENCOUNTER — OFFICE VISIT (OUTPATIENT)
Age: 54
End: 2024-11-20
Payer: COMMERCIAL

## 2024-11-20 VITALS
DIASTOLIC BLOOD PRESSURE: 88 MMHG | BODY MASS INDEX: 30.79 KG/M2 | SYSTOLIC BLOOD PRESSURE: 138 MMHG | WEIGHT: 196.2 LBS | HEIGHT: 67 IN

## 2024-11-20 DIAGNOSIS — Z78.9 NON-SMOKER: ICD-10-CM

## 2024-11-20 DIAGNOSIS — Z09 POSTOP CHECK: Primary | ICD-10-CM

## 2024-11-20 DIAGNOSIS — N95.1 MENOPAUSAL SYMPTOMS: ICD-10-CM

## 2024-11-20 RX ORDER — ESTRADIOL 0.5 MG/1
0.5 TABLET ORAL DAILY
Qty: 30 TABLET | Refills: 3 | Status: SHIPPED | OUTPATIENT
Start: 2024-11-20

## 2024-11-20 NOTE — PROGRESS NOTES
"Chief Complaint  Post-op (Pt here for 8wk post-op TLH/BSO 9/20, benign pathology, denies any problems)    Subjective        Pilar Pope presents to [facility]  History of Present Illness    Patient presents today for postop check  She has been compliant with postoperative restrictions  She is doing well without complaints    She does report classic vasomotor symptoms  We discussed estrogen replacement therapy  She wishes for trial    Op Note by Alban Valdes MD (09/20/2024 09:09)   Tissue Pathology Exam (09/20/2024 09:28)     Objective   Vital Signs:  /88 (BP Location: Left arm, Patient Position: Sitting, Cuff Size: Large Adult)   Ht 169.3 cm (66.65\")   Wt 89 kg (196 lb 3.2 oz)   BMI 31.05 kg/m²   Estimated body mass index is 31.05 kg/m² as calculated from the following:    Height as of this encounter: 169.3 cm (66.65\").    Weight as of this encounter: 89 kg (196 lb 3.2 oz).            Physical Exam  Vitals and nursing note reviewed.   Constitutional:       Appearance: Normal appearance. She is well-developed.   Cardiovascular:      Rate and Rhythm: Normal rate and regular rhythm.   Pulmonary:      Effort: Pulmonary effort is normal.      Breath sounds: Normal breath sounds.   Abdominal:      General: Bowel sounds are normal. There is no distension.      Palpations: Abdomen is soft.      Tenderness: There is no abdominal tenderness.      Hernia: No hernia is present.      Comments: Laparoscopic incisions have healed well without evidence of infection or hernia.   Genitourinary:     Exam position: Supine.      Labia:         Right: No tenderness or lesion.         Left: No tenderness or lesion.       Vagina: No tenderness or bleeding.      Comments: Vaginal cuff in tact and healing well.  No evidence of infection or dehiscence.  Skin:     General: Skin is warm and dry.   Neurological:      General: No focal deficit present.      Mental Status: She is alert and oriented to " person, place, and time. Mental status is at baseline.   Psychiatric:         Mood and Affect: Mood normal.         Behavior: Behavior normal.         Thought Content: Thought content normal.         Judgment: Judgment normal.        Result Review :                Assessment and Plan   Diagnoses and all orders for this visit:    1. Postop check (Primary)    2. Menopausal symptoms  -     estradiol (Estrace) 0.5 MG tablet; Take 1 tablet by mouth Daily.  Dispense: 30 tablet; Refill: 3    3. Non-smoker             Follow Up   Return in about 1 month (around 12/20/2024) for Telehealth, with me.  Patient was given instructions and counseling regarding her condition or for health maintenance advice. Please see specific information pulled into the AVS if appropriate.    Resume normal activities  Call for concerns or questions    Trial of low-dose estrogen  Risks and benefits discussed    Alban Valdes MD

## 2024-12-18 ENCOUNTER — TELEMEDICINE (OUTPATIENT)
Age: 54
End: 2024-12-18
Payer: COMMERCIAL

## 2024-12-18 ENCOUNTER — TELEPHONE (OUTPATIENT)
Age: 54
End: 2024-12-18
Payer: COMMERCIAL

## 2024-12-18 ENCOUNTER — TELEPHONE (OUTPATIENT)
Dept: OBSTETRICS AND GYNECOLOGY | Age: 54
End: 2024-12-18

## 2024-12-18 DIAGNOSIS — N95.1 MENOPAUSAL SYMPTOMS: Primary | ICD-10-CM

## 2024-12-18 DIAGNOSIS — Z78.9 NON-SMOKER: ICD-10-CM

## 2024-12-18 RX ORDER — ESTRADIOL 0.5 MG/1
0.5 TABLET ORAL DAILY
Qty: 30 TABLET | Refills: 3 | Status: SHIPPED | OUTPATIENT
Start: 2024-12-18

## 2024-12-18 NOTE — PROGRESS NOTES
"Chief Complaint  No chief complaint on file.  Menopausal symptoms    Subjective           Pilar Pope presents to [facility]  History of Present Illness  Patient presents today for follow-up  Last month, she was started on estrogen replacement therapy  Specifically, she started on estradiol 0.5 mg daily  She reports significant improvement in symptoms and wishes to continue   Objective   Vital Signs:   There were no vitals taken for this visit.    Estimated body mass index is 31.05 kg/m² as calculated from the following:    Height as of 11/20/24: 169.3 cm (66.65\").    Weight as of 11/20/24: 89 kg (196 lb 3.2 oz).     Physical Exam   Constitutional: She appears well-developed and well-nourished.   HENT:   Head: Normocephalic and atraumatic.   Neurological: She is alert.   Psychiatric: She has a normal mood and affect.     Result Review :                   Assessment and Plan      Diagnoses and all orders for this visit:    1. Menopausal symptoms (Primary)  -     estradiol (Estrace) 0.5 MG tablet; Take 1 tablet by mouth Daily.  Dispense: 30 tablet; Refill: 3    2. Non-smoker        Follow Up     Return in about 3 months (around 3/18/2025) for Telehealth, with me.  Patient was given instructions and counseling regarding her condition or for health maintenance advice. Please see specific information pulled into the AVS if appropriate.     Mode of Visit: Video  Location of patient: other: work  Location of provider: [facility]  You have chosen to receive care through a telehealth visit.  The patient has signed the video visit consent form.  The visit included audio and video interaction. No technical issues occurred during this visit.    Continue estradiol 0.5 mg daily  Call for concerns or questions    Alban Valdes MD    "

## 2024-12-18 NOTE — TELEPHONE ENCOUNTER
Called to schedule pt for 3mo f/u with Dr. Valdes, no answer, left voicemail to return my call at her earliest convenience.  Okay for HUB, support staff, or  to schedule if she returns call.

## 2024-12-18 NOTE — TELEPHONE ENCOUNTER
Called to schedule pt for 3mo f/u with Dr. Valdes, no answer, left voicemail to return my call at her earliest convenience.

## 2024-12-18 NOTE — TELEPHONE ENCOUNTER
Caller: Pilar Geen Niko  Female, 54 y.o., 1970  MRN: [id number]  CSN: [id number]  Phone: [phone]    Relationship: SELF        What is the best time to reach you: AFTER 1PM TODAY    Who are you requesting to speak with (clinical staff, provider,  specific staff member): KEITH    Do you know the name of the person who called: KEITH    What was the call regarding: APPT

## 2025-02-17 DIAGNOSIS — N95.1 MENOPAUSAL SYMPTOMS: ICD-10-CM

## 2025-02-17 RX ORDER — ESTRADIOL 0.5 MG/1
0.5 TABLET ORAL DAILY
Qty: 30 TABLET | Refills: 3 | OUTPATIENT
Start: 2025-02-17

## 2025-03-18 ENCOUNTER — TELEMEDICINE (OUTPATIENT)
Age: 55
End: 2025-03-18
Payer: COMMERCIAL

## 2025-03-18 ENCOUNTER — TELEPHONE (OUTPATIENT)
Age: 55
End: 2025-03-18
Payer: COMMERCIAL

## 2025-03-18 DIAGNOSIS — N95.1 MENOPAUSAL SYMPTOMS: ICD-10-CM

## 2025-03-18 DIAGNOSIS — Z78.9 NON-SMOKER: Primary | ICD-10-CM

## 2025-03-18 PROBLEM — N93.8 DUB (DYSFUNCTIONAL UTERINE BLEEDING): Status: RESOLVED | Noted: 2024-08-21 | Resolved: 2025-03-18

## 2025-03-18 PROBLEM — D25.9 UTERINE LEIOMYOMA: Status: RESOLVED | Noted: 2024-08-21 | Resolved: 2025-03-18

## 2025-03-18 RX ORDER — ESTRADIOL 0.5 MG/1
0.5 TABLET ORAL DAILY
Qty: 30 TABLET | Refills: 6 | Status: SHIPPED | OUTPATIENT
Start: 2025-03-18

## 2025-03-18 NOTE — TELEPHONE ENCOUNTER
Called to set up 6mo annual with Pretty for pt, no answer, left voicemail to return my call at her earliest convenience.  Okay for HUB, , or clinical staff to schedule if she returns call.

## 2025-03-18 NOTE — PROGRESS NOTES
"Chief Complaint  No chief complaint on file.    Menopausal symptoms    Subjective           Pilar Pope presents to [facility]  History of Present Illness    Patient presents today for follow-up  She is status post hysterectomy for symptomatic fibroids  She is doing well  She was started on estrogen postoperatively for control of menopausal symptoms  She is effectively being treated at the lowest dose of estradiol  Her mammogram is up-to-date    Objective   Vital Signs:   There were no vitals taken for this visit.    Estimated body mass index is 31.05 kg/m² as calculated from the following:    Height as of 11/20/24: 169.3 cm (66.65\").    Weight as of 11/20/24: 89 kg (196 lb 3.2 oz).     Physical Exam   Constitutional: She appears well-developed and well-nourished.   HENT:   Head: Normocephalic and atraumatic.   Neurological: She is alert.   Psychiatric: She has a normal mood and affect.     Result Review :                   Assessment and Plan      Diagnoses and all orders for this visit:    1. Non-smoker (Primary)    2. Menopausal symptoms  -     estradiol (Estrace) 0.5 MG tablet; Take 1 tablet by mouth Daily.  Dispense: 30 tablet; Refill: 6        Follow Up     Return in about 6 months (around 9/18/2025) for Annual physical with Pretty.  Patient was given instructions and counseling regarding her condition or for health maintenance advice. Please see specific information pulled into the AVS if appropriate.     Mode of Visit: Video  Location of patient: home  Location of provider: [facility]  You have chosen to receive care through a telehealth visit.  The patient has signed the video visit consent form.  The visit included audio and video interaction. No technical issues occurred during this visit.    Continue estradiol 0.5 mg daily  Call for concerns or questions    Alban Valdes MD    "

## 2025-07-03 ENCOUNTER — APPOINTMENT (OUTPATIENT)
Dept: GENERAL RADIOLOGY | Facility: HOSPITAL | Age: 55
End: 2025-07-03
Payer: COMMERCIAL

## 2025-07-03 ENCOUNTER — HOSPITAL ENCOUNTER (EMERGENCY)
Facility: HOSPITAL | Age: 55
Discharge: HOME | End: 2025-07-03
Attending: EMERGENCY MEDICINE
Payer: COMMERCIAL

## 2025-07-03 VITALS
OXYGEN SATURATION: 99 % | HEART RATE: 69 BPM | BODY MASS INDEX: 28.79 KG/M2 | DIASTOLIC BLOOD PRESSURE: 86 MMHG | WEIGHT: 190 LBS | RESPIRATION RATE: 20 BRPM | HEIGHT: 68 IN | SYSTOLIC BLOOD PRESSURE: 133 MMHG | TEMPERATURE: 98 F

## 2025-07-03 DIAGNOSIS — S93.402A SPRAIN OF LEFT ANKLE, UNSPECIFIED LIGAMENT, INITIAL ENCOUNTER: Primary | ICD-10-CM

## 2025-07-03 PROCEDURE — 73610 X-RAY EXAM OF ANKLE: CPT

## 2025-07-03 PROCEDURE — 99283 EMERGENCY DEPT VISIT LOW MDM: CPT | Performed by: EMERGENCY MEDICINE

## 2025-07-03 RX ORDER — HYDROCODONE BITARTRATE AND ACETAMINOPHEN 7.5; 325 MG/1; MG/1
1 TABLET ORAL ONCE
Refills: 0 | Status: COMPLETED | OUTPATIENT
Start: 2025-07-03 | End: 2025-07-03

## 2025-07-03 RX ORDER — HYDROCODONE BITARTRATE AND ACETAMINOPHEN 7.5; 325 MG/1; MG/1
1 TABLET ORAL EVERY 4 HOURS PRN
Qty: 10 TABLET | Refills: 0 | Status: SHIPPED | OUTPATIENT
Start: 2025-07-03

## 2025-07-03 RX ADMIN — HYDROCODONE BITARTRATE AND ACETAMINOPHEN 1 TABLET: 7.5; 325 TABLET ORAL at 20:17

## 2025-07-04 NOTE — ED PROVIDER NOTES
Subjective   History of Present Illness  Patient says she was stepping off some steps and rolled her left ankle.  She complains of pain on the lateral aspect.  She denies any other injury.    History provided by:  Patient   used: No    Ankle Injury  Location:  Left ankle  Quality:  Aching  Severity:  Moderate  Onset quality:  Sudden  Duration:  30 minutes  Timing:  Constant  Progression:  Unchanged  Chronicity:  New  Associated symptoms: no abdominal pain, no chest pain, no congestion, no diarrhea, no ear pain, no fatigue, no fever, no loss of consciousness, no rash, no sore throat and no wheezing        Review of Systems   Constitutional: Negative.  Negative for fatigue and fever.   HENT: Negative.  Negative for congestion, ear pain and sore throat.    Respiratory: Negative.  Negative for wheezing.    Cardiovascular:  Negative for chest pain.   Gastrointestinal:  Negative for abdominal pain and diarrhea.   Skin:  Negative for rash.   Neurological:  Negative for loss of consciousness.   All other systems reviewed and are negative.      Past Medical History:   Diagnosis Date    Anxiety Nicole 16    Going to see the dr this week    GERD (gastroesophageal reflux disease)     High cholesterol     History of transfusion 2022    Low blood due to heavy periods    Hypertension 2002    Iron deficiency anemia, unspecified 08/05/2021    Malabsorption syndrome 08/05/2021    Migraine     Multiple gestation October 4,1991 May 10 2002 October 14,2005    Skin cancer 2010    Urogenital trichomoniasis 2023    Uterine leiomyoma 08/21/2024    Varicella        Allergies   Allergen Reactions    Neosporin [Bacitracin-Polymyxin B] Hives       Past Surgical History:   Procedure Laterality Date    APPENDECTOMY      CHOLECYSTECTOMY  2017    LAPAROSCOPIC CHOLECYSTECTOMY  2019    LYMPH NODE BIOPSY      POSTPARTUM TUBAL LIGATION      SINUS SURGERY      SKIN CANCER EXCISION      TOTAL LAPAROSCOPIC HYSTERECTOMY SALPINGO  OOPHORECTOMY Bilateral 9/20/2024    Procedure: TOTAL LAPAROSCOPIC HYSTERECTOMY BILATERAL SALPINGOOPHORECTOMY WITH DAVINCI ROBOT;  Surgeon: Alban Valdes MD;  Location: [facility] OR;  Service: Robotics - DaVinci;  Laterality: Bilateral;    TUBAL ABDOMINAL LIGATION  2005    WISDOM TOOTH EXTRACTION         Family History   Problem Relation Age of Onset    Heart disease Father     Lung cancer Father     Diabetes Father     Hypertension Father     Breast cancer Neg Hx        Social History     Socioeconomic History    Marital status: Single   Tobacco Use    Smoking status: Never    Smokeless tobacco: Never   Vaping Use    Vaping status: Never Used   Substance and Sexual Activity    Alcohol use: Yes     Alcohol/week: 2.0 standard drinks of alcohol     Types: 2 Drinks containing 0.5 oz of alcohol per week     Comment: Once or twice a month    Drug use: Never    Sexual activity: Defer     Partners: Male     Birth control/protection: Tubal ligation       Prior to Admission medications    Medication Sig Start Date End Date Taking? Authorizing Provider   busPIRone (BUSPAR) 15 MG tablet Take 1 tablet by mouth 2 (Two) Times a Day. 7/29/24   Mary Sosa MD   estradiol (Estrace) 0.5 MG tablet Take 1 tablet by mouth Daily. 3/18/25   Alban Valdes MD   hydroCHLOROthiazide (HYDRODIURIL) 25 MG tablet Take 1 tablet by mouth Daily.    Mary Sosa MD   lisinopril (PRINIVIL,ZESTRIL) 40 MG tablet Take 1 tablet by mouth Daily.    Mary Sosa MD   pantoprazole (PROTONIX) 20 MG EC tablet Take 1 tablet by mouth Daily.    Mary Sosa MD   sertraline (ZOLOFT) 100 MG tablet Take 1 tablet by mouth Daily. 7/29/24   Mary Sosa MD   simvastatin (ZOCOR) 40 MG tablet Take 1 tablet by mouth Every Night.    Mary Sosa MD   tiZANidine (ZANAFLEX) 2 MG tablet     Mary Sosa MD   traZODone (DESYREL) 50 MG tablet Take 1 tablet by mouth Every Night. 7/29/24   Mary Sosa MD        Medications   HYDROcodone-acetaminophen (NORCO) 7.5-325 MG per tablet 1 tablet (has no administration in time range)       Vitals:    07/03/25 1918   BP: 136/85   Pulse: 81   Resp: 18   Temp: 98 °F (36.7 °C)   SpO2: 99%         Objective   Physical Exam  Vitals and nursing note reviewed.   Constitutional:       Appearance: Normal appearance.   HENT:      Head: Normocephalic and atraumatic.   Musculoskeletal:         General: Swelling and tenderness present.      Cervical back: Normal range of motion and neck supple.      Comments: Patient has some tenderness and swelling over the lateral malleolus of the left ankle.  There is no other obvious deformities.  She has good distal pulses and sensation.   Neurological:      General: No focal deficit present.      Mental Status: She is alert and oriented to person, place, and time.   Psychiatric:         Mood and Affect: Mood normal.         Behavior: Behavior normal.         Procedures         Lab Results (last 24 hours)       ** No results found for the last 24 hours. **            XR Ankle 3+ View Left   Final Result   Minimally displaced fracture at the tip of the medial   malleolus, may be subacute or chronic, however there is moderate lateral   soft tissue swelling.       This report was signed and finalized on 7/3/2025 7:49 PM by Dr. Adam Dey MD.              ED Course          MDM  Number of Diagnoses or Management Options  Sprain of left ankle, unspecified ligament, initial encounter: new and requires workup  Diagnosis management comments: I told the patient her x-ray is read as having a possible old chip fracture on the medial side but just swelling on the left side consistent with a sprain.  We will treated as such.  Will put her in an ankle stirrup and some crutches and something for the pain.  She is discharged in stable condition.       Amount and/or Complexity of Data Reviewed  Tests in the radiology section of CPT®: ordered and  reviewed    Risk of Complications, Morbidity, and/or Mortality  Presenting problems: moderate  Diagnostic procedures: moderate  Management options: moderate    Patient Progress  Patient progress: stable        Final diagnoses:   Sprain of left ankle, unspecified ligament, initial encounter          Dashawn Vidal Jr., MD  07/03/25 2014

## 2025-07-11 ENCOUNTER — APPOINTMENT (OUTPATIENT)
Dept: GENERAL RADIOLOGY | Facility: HOSPITAL | Age: 55
End: 2025-07-11
Payer: COMMERCIAL

## 2025-07-11 ENCOUNTER — HOSPITAL ENCOUNTER (EMERGENCY)
Facility: HOSPITAL | Age: 55
Discharge: HOME | End: 2025-07-11
Payer: COMMERCIAL

## 2025-07-11 VITALS
SYSTOLIC BLOOD PRESSURE: 148 MMHG | BODY MASS INDEX: 28.72 KG/M2 | DIASTOLIC BLOOD PRESSURE: 85 MMHG | TEMPERATURE: 97.9 F | RESPIRATION RATE: 18 BRPM | OXYGEN SATURATION: 95 % | WEIGHT: 189.5 LBS | HEIGHT: 68 IN | HEART RATE: 98 BPM

## 2025-07-11 DIAGNOSIS — S93.402A SPRAIN OF LEFT ANKLE, UNSPECIFIED LIGAMENT, INITIAL ENCOUNTER: Primary | ICD-10-CM

## 2025-07-11 PROCEDURE — 99283 EMERGENCY DEPT VISIT LOW MDM: CPT

## 2025-07-11 PROCEDURE — 73610 X-RAY EXAM OF ANKLE: CPT

## 2025-07-11 RX ORDER — PREDNISONE 20 MG/1
40 TABLET ORAL DAILY
Qty: 14 TABLET | Refills: 0 | Status: SHIPPED | OUTPATIENT
Start: 2025-07-11 | End: 2025-07-18

## 2025-07-11 RX ORDER — METHOTREXATE 2.5 MG/1
5 TABLET ORAL WEEKLY
COMMUNITY

## 2025-07-11 RX ORDER — IBUPROFEN 600 MG/1
600 TABLET, FILM COATED ORAL ONCE
Status: COMPLETED | OUTPATIENT
Start: 2025-07-11 | End: 2025-07-11

## 2025-07-11 RX ORDER — KETOROLAC TROMETHAMINE 10 MG/1
10 TABLET, FILM COATED ORAL EVERY 6 HOURS PRN
COMMUNITY
End: 2025-07-11

## 2025-07-11 RX ADMIN — IBUPROFEN 600 MG: 600 TABLET ORAL at 21:29

## 2025-07-12 NOTE — DISCHARGE INSTRUCTIONS
Prednisone as prescribed for pain and inflammation.  Elevate and ice 3 times daily.  Wear cam boot and use crutches as needed.

## 2025-07-12 NOTE — ED PROVIDER NOTES
Subjective   History of Present Illness twisted left ankle    Review of Systems left ankle pain    Past Medical History:   Diagnosis Date    Anxiety June 16    Going to see the dr this week    GERD (gastroesophageal reflux disease)     High cholesterol     History of transfusion 2022    Low blood due to heavy periods    Hypertension 2002    Iron deficiency anemia, unspecified 08/05/2021    Malabsorption syndrome 08/05/2021    Migraine     Multiple gestation October 4,1991 May 10 2002 October 14,2005    Skin cancer 2010    Urogenital trichomoniasis 2023    Uterine leiomyoma 08/21/2024    Varicella        Allergies   Allergen Reactions    Neosporin [Bacitracin-Polymyxin B] Hives       Past Surgical History:   Procedure Laterality Date    APPENDECTOMY      CHOLECYSTECTOMY  2017    LAPAROSCOPIC CHOLECYSTECTOMY  2019    LYMPH NODE BIOPSY      POSTPARTUM TUBAL LIGATION      SINUS SURGERY      SKIN CANCER EXCISION      TOTAL LAPAROSCOPIC HYSTERECTOMY SALPINGO OOPHORECTOMY Bilateral 9/20/2024    Procedure: TOTAL LAPAROSCOPIC HYSTERECTOMY BILATERAL SALPINGOOPHORECTOMY WITH DAVINCI ROBOT;  Surgeon: Alban Valdes MD;  Location: [facility] OR;  Service: Robotics - DaVinci;  Laterality: Bilateral;    TUBAL ABDOMINAL LIGATION  2005    WISDOM TOOTH EXTRACTION         Family History   Problem Relation Age of Onset    Heart disease Father     Lung cancer Father     Diabetes Father     Hypertension Father     Breast cancer Neg Hx        Social History     Socioeconomic History    Marital status: Single   Tobacco Use    Smoking status: Never    Smokeless tobacco: Never   Vaping Use    Vaping status: Never Used   Substance and Sexual Activity    Alcohol use: Yes     Alcohol/week: 2.0 standard drinks of alcohol     Types: 2 Drinks containing 0.5 oz of alcohol per week     Comment: Once or twice a month    Drug use: Never    Sexual activity: Defer     Partners: Male     Birth control/protection: Tubal ligation           Objective    Physical Exam    Procedures       Left ankle has mild edema over the lateral malleolus without gross deformity.  Good distal pulses.  Can plantarflex and dorsiflex.    ED Course                                                       Medical Decision Making  Problems Addressed:  Sprain of left ankle, unspecified ligament, initial encounter: complicated acute illness or injury    Amount and/or Complexity of Data Reviewed  Radiology: ordered.    Risk  Prescription drug management.    Suspected fracture distal fibula, placed in cam boot, crutches, follow-up with Dr. Riggs wrote for prednisone.    Final diagnoses:   Sprain of left ankle, unspecified ligament, initial encounter       ED Disposition  ED Disposition       ED Disposition   Discharge    Condition   Stable    Comment   --               Axel Riggs MD  [address] [place] [id number]  [phone]    Call   Call to schedule follow-up orthopedic appointment         Medication List        New Prescriptions      predniSONE 20 MG tablet  Commonly known as: DELTASONE  Take 2 tablets by mouth Daily for 7 days.            Stop      ketorolac 10 MG tablet  Commonly known as: TORADOL               Where to Get Your Medications        These medications were sent to [facility] - [place], [place] - [address] - [phone]  - [phone] [place] [address] [address], [facility] [address]      Phone: [phone]   predniSONE 20 MG tablet            Saurav Mckeon PA-C  07/11/25 2150       Saurav Mckeon PA-C  07/11/25 2151

## 2025-07-14 ENCOUNTER — TELEPHONE (OUTPATIENT)
Age: 55
End: 2025-07-14

## 2025-07-14 NOTE — TELEPHONE ENCOUNTER
Shauna Seals  1970  [employer] 7/11  Lt Ankle Sprain  DOI 7/3  Boot  Yes Xrays   [place] [employer]  [phone]

## 2025-07-15 ENCOUNTER — OFFICE VISIT (OUTPATIENT)
Age: 55
End: 2025-07-15
Payer: COMMERCIAL

## 2025-07-15 VITALS — BODY MASS INDEX: 28.64 KG/M2 | WEIGHT: 189 LBS | HEIGHT: 68 IN

## 2025-07-15 DIAGNOSIS — S82.839A AVULSION FRACTURE OF DISTAL END OF FIBULA: Primary | ICD-10-CM

## 2025-07-15 PROCEDURE — 29405 APPL SHORT LEG CAST: CPT | Performed by: NURSE PRACTITIONER

## 2025-07-15 PROCEDURE — 99203 OFFICE O/P NEW LOW 30 MIN: CPT | Performed by: NURSE PRACTITIONER

## 2025-07-15 RX ORDER — MELOXICAM 15 MG/1
TABLET ORAL
COMMUNITY

## 2025-07-15 RX ORDER — SIMVASTATIN 40 MG
40 TABLET ORAL NIGHTLY
COMMUNITY

## 2025-07-15 RX ORDER — HYDROCHLOROTHIAZIDE 25 MG/1
25 TABLET ORAL DAILY
COMMUNITY

## 2025-07-15 RX ORDER — METHOCARBAMOL 500 MG/1
TABLET, FILM COATED ORAL
COMMUNITY
Start: 2025-07-07

## 2025-07-15 RX ORDER — ESTRADIOL 0.5 MG/1
0.5 TABLET ORAL DAILY
COMMUNITY
Start: 2025-03-18

## 2025-07-15 RX ORDER — TIZANIDINE 2 MG/1
TABLET ORAL
COMMUNITY

## 2025-07-15 RX ORDER — TRAZODONE HYDROCHLORIDE 50 MG/1
50 TABLET ORAL NIGHTLY
COMMUNITY
Start: 2024-07-29

## 2025-07-15 RX ORDER — MUPIROCIN 2 %
OINTMENT (GRAM) TOPICAL
COMMUNITY
Start: 2025-06-03

## 2025-07-15 RX ORDER — SERTRALINE HYDROCHLORIDE 100 MG/1
100 TABLET, FILM COATED ORAL DAILY
COMMUNITY
Start: 2024-07-29

## 2025-07-15 RX ORDER — KETOROLAC TROMETHAMINE 10 MG/1
TABLET, FILM COATED ORAL
COMMUNITY
Start: 2025-07-07

## 2025-07-15 RX ORDER — LISINOPRIL 40 MG/1
40 TABLET ORAL DAILY
COMMUNITY

## 2025-07-15 RX ORDER — BUSPIRONE HYDROCHLORIDE 15 MG/1
15 TABLET ORAL 2 TIMES DAILY
COMMUNITY
Start: 2024-07-29

## 2025-07-15 RX ORDER — ENOXAPARIN SODIUM 100 MG/ML
40 INJECTION SUBCUTANEOUS DAILY
Qty: 12 ML | Refills: 0 | Status: SHIPPED | OUTPATIENT
Start: 2025-07-15

## 2025-07-15 RX ORDER — METHOTREXATE 2.5 MG/1
5 TABLET ORAL WEEKLY
COMMUNITY

## 2025-07-15 RX ORDER — PANTOPRAZOLE SODIUM 20 MG/1
20 TABLET, DELAYED RELEASE ORAL DAILY
COMMUNITY
Start: 2014-03-14

## 2025-07-15 RX ORDER — PREDNISONE 20 MG/1
40 TABLET ORAL DAILY
COMMUNITY
Start: 2025-07-11 | End: 2025-07-19

## 2025-07-15 RX ORDER — HYDROCODONE BITARTRATE AND ACETAMINOPHEN 7.5; 325 MG/1; MG/1
1 TABLET ORAL EVERY 4 HOURS PRN
COMMUNITY
Start: 2025-07-03

## 2025-07-15 ASSESSMENT — ENCOUNTER SYMPTOMS
COLOR CHANGE: 0
VOMITING: 0
CONSTIPATION: 0
DIARRHEA: 0
BLOOD IN STOOL: 0
SHORTNESS OF BREATH: 0
COUGH: 0
NAUSEA: 0

## 2025-07-15 NOTE — PROGRESS NOTES
Chief Complaint    Chief Complaint   Patient presents with    Ankle Pain     Left ankle        Body Part: Ankle left    When did the symptoms begin/Date of Onset? 7/3/25    Where did the injury happen? Home    How did the injury happen? Rolled her ankle on the doormat.  Immediate swelling    If over 55, have you aguilar an Osteoporosis Screening in the last 2 years? NA    Injury Details    Previous similar problems or complaints? No    Severity of Pain: 6    Character of Pain: Dull and muscle spasms    What makes your symptoms worse? Other: Sitting makes it throb.  Boot has helped.    How long does the pain last? Off and on; similar to joanie horse    Associated Symptoms: Swelling and Numbness, bruising.  Has a knot on the back of her leg.    What makes your symptoms better? Other: Boot helps    Previous Treatment for the Problem: Bracing    Any special diagnostic tests or studies done? X-Rays; Where? [facility] x 2    Similar complaints on opposite side? No    Review of Systems    Review of Systems   Constitutional:  Negative for appetite change, chills, fatigue and fever.   Respiratory:  Negative for cough and shortness of breath.    Cardiovascular:  Negative for chest pain, palpitations and leg swelling.   Gastrointestinal:  Negative for blood in stool, constipation, diarrhea, nausea and vomiting.   Musculoskeletal:  Negative for arthralgias, gait problem and joint swelling.   Skin:  Negative for color change and wound.   Neurological:  Negative for weakness.

## 2025-07-15 NOTE — PROGRESS NOTES
[facility]  [facility]  [address]  [facility] [id number] [phone]     Patient: Shauna Seals   YOB: 1970   Date: 7/15/2025   Visit Type:      History of Present Illness  Chief Complaint   Patient presents with    Ankle Pain     Left ankle       This is a 55 y.o. female presents today complaining of left ankle pain.  The onset began 7/3/2025.  She states that she rolled her ankle on the door mat.  Had immediate swelling.  She rates the pain 6 out of 10.  Describes pain as dull.  Sitting makes it throb.  She was placed in a boot at the emergency room.  She relates this to the swelling, numbness, bruising.  She did have x-rays at [facility] emergency room.  X-rays completed of the left ankle including 3 views at [facility] on 7/11/2025.  X-rays revealed soft tissue swelling, laterally.  No acute fracture or stress fracture was noted.  X-ray did reveal a corticated ossific density distal to the lateral malleolus.  She is 1 week and 5 days out from the date of injury.    Past Medical History:   Diagnosis Date    Cancer ([facility]) 2010    Skin cancer all removed    Fractures     Broken wrist,elbow,nose,finger    Ganglion cyst     Right thumb removed cyst    GERD (gastroesophageal reflux disease)     Acid reflux    Hypertension       Past Surgical History:   Procedure Laterality Date    ANKLE FRACTURE SURGERY  1998    Right ankle with a pin    HYSTERECTOMY (CERVIX STATUS UNKNOWN)  09/2024      Social History     Socioeconomic History    Marital status: Single     Spouse name: None    Number of children: None    Years of education: None    Highest education level: None   Tobacco Use    Smoking status: Never    Smokeless tobacco: Never   Substance and Sexual Activity    Alcohol use: Not Currently    Drug use: Never    Sexual activity: Not Currently     Partners: Male     Social Drivers of Health     Intimate Partner

## 2025-07-15 NOTE — PROGRESS NOTES
Casting Notes:    Type of cast/splint: Short Leg Cast Application     Material Used:  1. Cast Paddin inch roll x  3 rolls.     2.      3.    Fiberglass Cast Tape: 4 inch roll x 3 rolls.    Reason for Application:     ICD-10-CM    1. Avulsion fracture of distal end of fibula  S82.839A DME Order for (Specify) as OP     DME Order for (Specify) as OP     AL CAST SUPL SHORT LEG ADULT FIBERGLASS     APPLY SHORT LEG CAST     enoxaparin (LOVENOX) 40 MG/0.4ML           Patient was given cast care instructions, and told to call the office with any complaints, or concerns.     Patient was also told to keep their routine follow up appointment.    Milton Carcamo MA

## 2025-07-18 ENCOUNTER — TELEPHONE (OUTPATIENT)
Age: 55
End: 2025-07-18

## 2025-07-18 NOTE — TELEPHONE ENCOUNTER
Spoke to this patient regarding her work note. Her employer needed the note to say she was off work until her next appointment and not until cleared.  Updated note faxed to [phone], per the patient.

## 2025-07-18 NOTE — TELEPHONE ENCOUNTER
Patient states letter for work states until cleared but it needs to say until July 30 for her work excuse   [phone]

## 2025-07-30 ENCOUNTER — OFFICE VISIT (OUTPATIENT)
Age: 55
End: 2025-07-30
Payer: COMMERCIAL

## 2025-07-30 ENCOUNTER — TELEPHONE (OUTPATIENT)
Age: 55
End: 2025-07-30

## 2025-07-30 VITALS — HEIGHT: 68 IN | WEIGHT: 189 LBS | BODY MASS INDEX: 28.64 KG/M2

## 2025-07-30 DIAGNOSIS — S82.839A AVULSION FRACTURE OF DISTAL END OF FIBULA: Primary | ICD-10-CM

## 2025-07-30 PROCEDURE — 99213 OFFICE O/P EST LOW 20 MIN: CPT | Performed by: NURSE PRACTITIONER

## 2025-07-30 ASSESSMENT — ENCOUNTER SYMPTOMS
NAUSEA: 0
BLOOD IN STOOL: 0
SHORTNESS OF BREATH: 0
CONSTIPATION: 0
DIARRHEA: 0
COLOR CHANGE: 0
COUGH: 0
VOMITING: 0

## 2025-07-30 NOTE — PROGRESS NOTES
[facility]  [facility]  [address]  [facility] [id number] [phone]     Patient: Shauna Seals   YOB: 1970   Date: 7/30/2025   Visit Type:  Follow up    Body Part: Ankle left    What was the date of Injury? 7/3/25    Patient states her ankle is a little better.  Good days and bad days.  Muscle spasms have stopped.  Pain scale is 3 today, which she manages with Tylenol.    Review of Systems    Review of Systems   Constitutional:  Negative for appetite change, chills, fatigue and fever.   Respiratory:  Negative for cough and shortness of breath.    Cardiovascular:  Negative for chest pain, palpitations and leg swelling.   Gastrointestinal:  Negative for blood in stool, constipation, diarrhea, nausea and vomiting.   Musculoskeletal:  Negative for arthralgias, gait problem and joint swelling.   Skin:  Negative for color change and wound.   Neurological:  Negative for weakness.        
     [facility]  [facility]  [address]  [facility] [id number] [phone]     Patient: Shauna Seals   YOB: 1970   Date: 7/30/2025   Visit Type:      History of Present Illness  Chief Complaint   Patient presents with    Follow-up     Left ankle         History of Present Illness  The patient is a 55-year-old female who presents today for follow-up of left ankle pain.    She was initially seen on 07/15/2025 following an injury on 07/03/2025, where she rolled her ankle on her doormat. Initial x-rays were taken at [facility].  On her initial exam, she was recommended to be non-weightbearing and was placed in a below-the-knee fiberglass cast. She is taking Lovenox for DVT prophylaxis and is utilizing a knee scooter.    She reports a decrease in pain, with muscle spasms having resolved, and currently rates her pain as 3 out of 10.She also mentions that upon lying down and getting up, she experiences shooting pain that seems to originate from her hip and radiates down her leg muscle. She has been using crutches for mobility.  X-rays revealed avulsion fracture of the fibula.    Past Medical History:   Diagnosis Date    Cancer (HCC) 2010    Skin cancer all removed    Fractures     Broken wrist,elbow,nose,finger    Ganglion cyst     Right thumb removed cyst    GERD (gastroesophageal reflux disease)     Acid reflux    Hypertension       Past Surgical History:   Procedure Laterality Date    ANKLE FRACTURE SURGERY  1998    Right ankle with a pin    HYSTERECTOMY (CERVIX STATUS UNKNOWN)  09/2024      Social History     Socioeconomic History    Marital status: Single     Spouse name: None    Number of children: None    Years of education: None    Highest education level: None   Tobacco Use    Smoking status: Never    Smokeless tobacco: Never   Substance and Sexual Activity    Alcohol use: Not Currently    Drug use: Never    
Patient presents today for Follow Up Lt Ankle visit with AMITA Young and no x-rays out of cast. The old cast was removed,skin intact and patient sent to x-ray       
oral

## 2025-08-04 ENCOUNTER — TELEPHONE (OUTPATIENT)
Age: 55
End: 2025-08-04

## 2025-08-12 ENCOUNTER — TELEPHONE (OUTPATIENT)
Age: 55
End: 2025-08-12

## 2025-08-18 ENCOUNTER — TELEPHONE (OUTPATIENT)
Age: 55
End: 2025-08-18

## 2025-08-20 ENCOUNTER — OFFICE VISIT (OUTPATIENT)
Age: 55
End: 2025-08-20

## 2025-08-20 VITALS — BODY MASS INDEX: 28.64 KG/M2 | WEIGHT: 189 LBS | HEIGHT: 68 IN

## 2025-08-20 DIAGNOSIS — S82.839A AVULSION FRACTURE OF DISTAL END OF FIBULA: Primary | ICD-10-CM

## 2025-08-20 ASSESSMENT — ENCOUNTER SYMPTOMS
BLOOD IN STOOL: 0
NAUSEA: 0
COLOR CHANGE: 0
VOMITING: 0
CONSTIPATION: 0
SHORTNESS OF BREATH: 0
COUGH: 0
DIARRHEA: 0

## (undated) DEVICE — CLTH CLENS READYCLEANSE PERI CARE PK/5

## (undated) DEVICE — SEAL

## (undated) DEVICE — PATIENT RETURN ELECTRODE, SINGLE-USE, CONTACT QUALITY MONITORING, ADULT, WITH 9FT CORD, FOR PATIENTS WEIGING OVER 33LBS. (15KG): Brand: MEGADYNE

## (undated) DEVICE — GLV SURG SENSICARE W/ALOE PF LF 7.5 STRL

## (undated) DEVICE — ARM DRAPE

## (undated) DEVICE — MANIP UTER ELEVATOR/PRO W/LNG/HNDL CERV/CUP 35MM MD

## (undated) DEVICE — INTENDED TO AID IN THE PASSING OF SUTURES THROUGH BONE AND SOFT TISSUE DURING ORTHOPEDIC SURGERY: Brand: HOFFEE SUTURE RETRIEVER

## (undated) DEVICE — VESSEL SEALER EXTEND: Brand: ENDOWRIST

## (undated) DEVICE — TOTAL TRAY, 16FR 10ML SIL FOLEY, URN: Brand: MEDLINE

## (undated) DEVICE — KT CLN CLEANOR SCPE

## (undated) DEVICE — ST TBG AIRSEAL FLTR TRI LUM

## (undated) DEVICE — PK POSTN TRENGUARD450 PROC

## (undated) DEVICE — DAVINCI: Brand: MEDLINE INDUSTRIES, INC.

## (undated) DEVICE — TROC BLADLES ANCHORPORT/OPTI LP 8X120MM 1P/U

## (undated) DEVICE — ANTIBACTERIAL UNDYED BRAIDED (POLYGLACTIN 910), SYNTHETIC ABSORBABLE SUTURE: Brand: COATED VICRYL

## (undated) DEVICE — BLADELESS OBTURATOR: Brand: WECK VISTA